# Patient Record
Sex: FEMALE | Race: WHITE | NOT HISPANIC OR LATINO | Employment: FULL TIME | URBAN - METROPOLITAN AREA
[De-identification: names, ages, dates, MRNs, and addresses within clinical notes are randomized per-mention and may not be internally consistent; named-entity substitution may affect disease eponyms.]

---

## 2022-02-21 ENCOUNTER — TELEPHONE (OUTPATIENT)
Dept: SCHEDULING | Facility: IMAGING CENTER | Age: 32
End: 2022-02-21

## 2022-03-11 ENCOUNTER — OFFICE VISIT (OUTPATIENT)
Dept: MEDICAL GROUP | Facility: PHYSICIAN GROUP | Age: 32
End: 2022-03-11
Payer: COMMERCIAL

## 2022-03-11 VITALS
BODY MASS INDEX: 22.89 KG/M2 | TEMPERATURE: 97.5 F | SYSTOLIC BLOOD PRESSURE: 102 MMHG | RESPIRATION RATE: 18 BRPM | WEIGHT: 169 LBS | OXYGEN SATURATION: 98 % | DIASTOLIC BLOOD PRESSURE: 67 MMHG | HEART RATE: 89 BPM | HEIGHT: 72 IN

## 2022-03-11 DIAGNOSIS — M65.4 TENDINITIS, DE QUERVAIN'S: ICD-10-CM

## 2022-03-11 DIAGNOSIS — Z98.890 HISTORY OF REPAIR OF ACL: ICD-10-CM

## 2022-03-11 DIAGNOSIS — F90.9 ATTENTION DEFICIT HYPERACTIVITY DISORDER (ADHD), UNSPECIFIED ADHD TYPE: ICD-10-CM

## 2022-03-11 DIAGNOSIS — Z87.42 HISTORY OF ABNORMAL CERVICAL PAP SMEAR: ICD-10-CM

## 2022-03-11 DIAGNOSIS — Z72.0 VAPES NICOTINE CONTAINING SUBSTANCE: ICD-10-CM

## 2022-03-11 DIAGNOSIS — Z00.00 ENCOUNTER FOR MEDICAL EXAMINATION TO ESTABLISH CARE: ICD-10-CM

## 2022-03-11 DIAGNOSIS — F33.41 RECURRENT MAJOR DEPRESSIVE DISORDER, IN PARTIAL REMISSION (HCC): ICD-10-CM

## 2022-03-11 DIAGNOSIS — F90.0 ADHD (ATTENTION DEFICIT HYPERACTIVITY DISORDER), INATTENTIVE TYPE: ICD-10-CM

## 2022-03-11 PROCEDURE — 99204 OFFICE O/P NEW MOD 45 MIN: CPT

## 2022-03-11 RX ORDER — BUPROPION HYDROCHLORIDE 150 MG/1
150 TABLET, EXTENDED RELEASE ORAL
COMMUNITY
End: 2022-05-09 | Stop reason: SDUPTHER

## 2022-03-11 RX ORDER — DEXTROAMPHETAMINE SACCHARATE, AMPHETAMINE ASPARTATE, DEXTROAMPHETAMINE SULFATE AND AMPHETAMINE SULFATE 5; 5; 5; 5 MG/1; MG/1; MG/1; MG/1
20 TABLET ORAL 4 TIMES DAILY
COMMUNITY
End: 2022-04-05 | Stop reason: SDUPTHER

## 2022-03-11 ASSESSMENT — PATIENT HEALTH QUESTIONNAIRE - PHQ9: CLINICAL INTERPRETATION OF PHQ2 SCORE: 0

## 2022-03-11 NOTE — ASSESSMENT & PLAN NOTE
Patient is currently on Wellbutrin.  She reports good management of her depression with this medication.  Again, she wishes for referral to psychiatry and psychology for management

## 2022-03-11 NOTE — ASSESSMENT & PLAN NOTE
Patient reports a longstanding history of ADHD and is requesting a referral to psychiatry today.  She currently manages this with 10 mg of Adderall 1-2 times per day.  She does not need this refilled today

## 2022-03-11 NOTE — PROGRESS NOTES
CC:    Chief Complaint   Patient presents with   • Establish Care   • Referral Needed        HISTORY OF THE PRESENT ILLNESS: This is a pleasant 31 y.o. female presenting today to [facility], who wishes to discuss the following problems listed below:     ADHD  Patient reports a longstanding history of ADHD and is requesting a referral to psychiatry today.  She currently manages this with 10 mg of Adderall 1-2 times per day.  She does not need this refilled today    Recurrent major depressive disorder, in partial remission (HCC)  Patient is currently on Wellbutrin.  She reports good management of her depression with this medication.  Again, she wishes for referral to psychiatry and psychology for management      No past medical history on file.    Allergies: Patient has no known allergies.    Current Outpatient Medications   Medication Sig Dispense Refill   • amphetamine-dextroamphetamine (ADDERALL) 20 MG Tab Take 20 mg by mouth 4 times a day. Take half a tab     • buPROPion SR (WELLBUTRIN-SR) 150 MG TABLET SR 12 HR sustained-release tablet Take 150 mg by mouth 1 time a day as needed.       No current facility-administered medications for this visit.       ROS:   Constitutional: Patient denies any fever, chills, night sweats, weight loss/gain, fatigue, or malaise.   Eyes: Patient denies any photophobia, eye pain, diplopia, discharge, dryness, excessive tearing, redness, or VA changes.   ENT: Patient denies any runny nose, hoarseness, sore throat, or dysphagia.   Resp: Patient denies cough, wheezing, or shortness of breath.   CV: Patient denies any chest pain, claudication, cyanosis, palpitations, or edema.   GI: Patient denies any constipation, nausea, vomiting, diarrhea, bloating, abdominal pain, or dyspepsia.   MSK: Patient denies any joint pain, cramps, swelling, weakness, stiffness, or tremor  Skin: Patient denies any color changes, skin changes, lesions, ulcerations, wounds, and pruritus, hair or nail  changes.   Neuro: Patient denies any headache, numbness or tingling  Psych: Patient denies any suicidal or homicidal ideation, depression or anxiety.     Exam: /67 (BP Location: Left arm, Patient Position: Sitting, BP Cuff Size: Adult)   Pulse 89   Temp 36.4 °C (97.5 °F) (Temporal)   Resp 18   Ht 1.829 m (6')   Wt 76.7 kg (169 lb)   SpO2 98%  Body mass index is 22.92 kg/m².    Gen: Alert and oriented x4. Well developed, well-nourished female in no apparent distress.  Skin: Warm, dry, good turgor, no rashes in visible areas or lacerations appreciated.   Eye: EOM intact, pupils equal, round and reactive, conjunctiva clear, lids normal.  Neck: Trachea midline, no masses, no thyromegaly  GI:  Soft, non-tender abdomen with no distention.   MSK: Normal gait, moves all extremities.  Neuro: Alert and oriented x 4, non-focal exam with motor and sensory grossly intact.  Ext: No clubbing, cyanosis, edema.  Psych: Normal behavior, affect and mood.      Assessment/Plan:    31 y.o. female with the followin. Encounter for medical examination to establish care  - Comp Metabolic Panel; Future  - CBC WITHOUT DIFFERENTIAL; Future  - TSH WITH REFLEX TO FT4; Future  - VITAMIN D,25 HYDROXY; Future    2. ADHD (attention deficit hyperactivity disorder), inattentive type  Chronic condition.  Managed with Adderall 10 mg 1-2 times per day.  No refill needed today.  PDMP from [place] reviewed, appropriate.  Referrals to psychiatry and psychology placed today.  Advised patient that if she does need a refill of these medications prior to acceptance into psychiatry then she may request a refill from our office  - Referral to Psychiatry  - Referral to Psychology    3. Recurrent major depressive disorder, in partial remission (HCC)  Chronic condition.  Managed with Wellbutrin 150 mg p.o. daily.  She reports that Wellbutrin is working well for her when she remembers to take it.  She does verbalize some communication issues with her  and her partner and would like to establish with psychology for better management of this  - Referral to Psychiatry  - Referral to Psychology    4.  Vapes nicotine-containing substance  Patient does report that she gave up smoking and now uses a low-temperature vapor pen to reduce her risk of popcorn lung.  Approximately 3 minutes spent discussing the risk of smoking, nicotine, and vaping.  Patient verbalizes understanding and is well educated.    Follow-up: Return in about 1 year (around 3/11/2023).    Health Maintenance: Completed.  Patient did have an updated Pap March 2021.  Normal.  Due for updated Pap in March 2024    I have placed the below orders and discussed them with an approved delegating provider.  The MA is performing the below orders under the direction of Brianna Farfan MD.    Please note that this dictation was created using voice recognition software. I have made every reasonable attempt to correct obvious errors, but I expect that there are errors of grammar and possibly content that I did not discover before finalizing the note.    Electronically signed by NAVARRO Clark on March 11, 2022

## 2022-04-27 DIAGNOSIS — F90.0 ADHD (ATTENTION DEFICIT HYPERACTIVITY DISORDER), INATTENTIVE TYPE: ICD-10-CM

## 2022-04-27 RX ORDER — DEXTROAMPHETAMINE SACCHARATE, AMPHETAMINE ASPARTATE, DEXTROAMPHETAMINE SULFATE AND AMPHETAMINE SULFATE 5; 5; 5; 5 MG/1; MG/1; MG/1; MG/1
20 TABLET ORAL 2 TIMES DAILY
Qty: 60 EACH | Refills: 0 | Status: SHIPPED | OUTPATIENT
Start: 2022-04-27 | End: 2022-06-01 | Stop reason: SDUPTHER

## 2022-05-09 RX ORDER — BUPROPION HYDROCHLORIDE 150 MG/1
150 TABLET, EXTENDED RELEASE ORAL DAILY
Qty: 90 TABLET | Refills: 0 | Status: SHIPPED
Start: 2022-05-09 | End: 2022-05-13

## 2022-05-13 DIAGNOSIS — F33.41 RECURRENT MAJOR DEPRESSIVE DISORDER, IN PARTIAL REMISSION (HCC): ICD-10-CM

## 2022-05-13 DIAGNOSIS — F90.0 ADHD (ATTENTION DEFICIT HYPERACTIVITY DISORDER), INATTENTIVE TYPE: ICD-10-CM

## 2022-05-13 RX ORDER — BUPROPION HYDROCHLORIDE 150 MG/1
150 TABLET ORAL EVERY MORNING
Qty: 90 TABLET | Refills: 1 | Status: SHIPPED | OUTPATIENT
Start: 2022-05-13 | End: 2022-07-06 | Stop reason: SDUPTHER

## 2022-06-01 DIAGNOSIS — F90.0 ADHD (ATTENTION DEFICIT HYPERACTIVITY DISORDER), INATTENTIVE TYPE: ICD-10-CM

## 2022-06-02 RX ORDER — DEXTROAMPHETAMINE SACCHARATE, AMPHETAMINE ASPARTATE, DEXTROAMPHETAMINE SULFATE AND AMPHETAMINE SULFATE 5; 5; 5; 5 MG/1; MG/1; MG/1; MG/1
20 TABLET ORAL 2 TIMES DAILY
Qty: 60 EACH | Refills: 0 | Status: SHIPPED | OUTPATIENT
Start: 2022-06-02 | End: 2022-07-02

## 2022-06-06 PROBLEM — Z86.16 HISTORY OF COVID-19: Status: ACTIVE | Noted: 2022-06-06

## 2022-07-06 ENCOUNTER — TELEMEDICINE (OUTPATIENT)
Dept: BEHAVIORAL HEALTH | Facility: CLINIC | Age: 32
End: 2022-07-06
Payer: COMMERCIAL

## 2022-07-06 DIAGNOSIS — F90.2 ATTENTION DEFICIT HYPERACTIVITY DISORDER (ADHD), COMBINED TYPE: ICD-10-CM

## 2022-07-06 DIAGNOSIS — F90.0 ADHD (ATTENTION DEFICIT HYPERACTIVITY DISORDER), INATTENTIVE TYPE: ICD-10-CM

## 2022-07-06 DIAGNOSIS — F90.2 ADHD (ATTENTION DEFICIT HYPERACTIVITY DISORDER), COMBINED TYPE: ICD-10-CM

## 2022-07-06 DIAGNOSIS — F33.41 RECURRENT MAJOR DEPRESSIVE DISORDER, IN PARTIAL REMISSION (HCC): ICD-10-CM

## 2022-07-06 PROCEDURE — 90833 PSYTX W PT W E/M 30 MIN: CPT | Mod: GT | Performed by: PSYCHIATRY & NEUROLOGY

## 2022-07-06 PROCEDURE — 99204 OFFICE O/P NEW MOD 45 MIN: CPT | Mod: GT | Performed by: PSYCHIATRY & NEUROLOGY

## 2022-07-06 RX ORDER — BUPROPION HYDROCHLORIDE 150 MG/1
150 TABLET ORAL EVERY MORNING
Qty: 90 TABLET | Refills: 0 | Status: SHIPPED | OUTPATIENT
Start: 2022-07-06

## 2022-07-06 RX ORDER — DEXTROAMPHETAMINE SACCHARATE, AMPHETAMINE ASPARTATE, DEXTROAMPHETAMINE SULFATE AND AMPHETAMINE SULFATE 5; 5; 5; 5 MG/1; MG/1; MG/1; MG/1
20 TABLET ORAL
Qty: 30 TABLET | Refills: 0 | Status: SHIPPED | OUTPATIENT
Start: 2022-07-06 | End: 2022-08-05

## 2022-07-06 RX ORDER — DEXTROAMPHETAMINE SACCHARATE, AMPHETAMINE ASPARTATE MONOHYDRATE, DEXTROAMPHETAMINE SULFATE AND AMPHETAMINE SULFATE 5; 5; 5; 5 MG/1; MG/1; MG/1; MG/1
20 CAPSULE, EXTENDED RELEASE ORAL EVERY MORNING
Qty: 30 CAPSULE | Refills: 0 | Status: SHIPPED | OUTPATIENT
Start: 2022-07-06 | End: 2022-08-05

## 2022-07-06 ASSESSMENT — PATIENT HEALTH QUESTIONNAIRE - PHQ9
CLINICAL INTERPRETATION OF PHQ2 SCORE: 0
5. POOR APPETITE OR OVEREATING: 3 - NEARLY EVERY DAY
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
## 2022-07-30 ENCOUNTER — HOSPITAL ENCOUNTER (OUTPATIENT)
Dept: RADIOLOGY | Facility: MEDICAL CENTER | Age: 32
End: 2022-07-30
Attending: NURSE PRACTITIONER
Payer: COMMERCIAL

## 2022-07-30 ENCOUNTER — OFFICE VISIT (OUTPATIENT)
Dept: URGENT CARE | Facility: PHYSICIAN GROUP | Age: 32
End: 2022-07-30
Payer: COMMERCIAL

## 2022-07-30 VITALS
SYSTOLIC BLOOD PRESSURE: 90 MMHG | DIASTOLIC BLOOD PRESSURE: 68 MMHG | TEMPERATURE: 98.2 F | OXYGEN SATURATION: 95 % | HEART RATE: 94 BPM

## 2022-07-30 DIAGNOSIS — S86.912A KNEE STRAIN, LEFT, INITIAL ENCOUNTER: ICD-10-CM

## 2022-07-30 DIAGNOSIS — S89.92XA LEFT KNEE INJURY, INITIAL ENCOUNTER: ICD-10-CM

## 2022-07-30 DIAGNOSIS — M25.462 EFFUSION OF LEFT KNEE: ICD-10-CM

## 2022-07-30 PROCEDURE — 73564 X-RAY EXAM KNEE 4 OR MORE: CPT | Mod: LT

## 2022-07-30 PROCEDURE — 99213 OFFICE O/P EST LOW 20 MIN: CPT | Performed by: NURSE PRACTITIONER

## 2022-07-30 ASSESSMENT — ENCOUNTER SYMPTOMS
WEAKNESS: 0
CHILLS: 0
FALLS: 1
BRUISES/BLEEDS EASILY: 0
SENSORY CHANGE: 0
TINGLING: 0
MYALGIAS: 1
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
## 2022-08-23 ENCOUNTER — OFFICE VISIT (OUTPATIENT)
Dept: SPORTS MEDICINE | Facility: CLINIC | Age: 32
End: 2022-08-23
Payer: COMMERCIAL

## 2022-08-23 VITALS
TEMPERATURE: 98.8 F | HEART RATE: 95 BPM | BODY MASS INDEX: 22.89 KG/M2 | RESPIRATION RATE: 16 BRPM | OXYGEN SATURATION: 99 % | HEIGHT: 72 IN | SYSTOLIC BLOOD PRESSURE: 116 MMHG | DIASTOLIC BLOOD PRESSURE: 76 MMHG | WEIGHT: 169 LBS

## 2022-08-23 DIAGNOSIS — R29.898 POOR BODY MECHANICS: ICD-10-CM

## 2022-08-23 DIAGNOSIS — S86.912A KNEE STRAIN, LEFT, INITIAL ENCOUNTER: ICD-10-CM

## 2022-08-23 DIAGNOSIS — Z98.890 S/P LEFT KNEE SURGERY: ICD-10-CM

## 2022-08-23 PROCEDURE — 99213 OFFICE O/P EST LOW 20 MIN: CPT | Performed by: FAMILY MEDICINE
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
## 2022-09-26 ENCOUNTER — HOSPITAL ENCOUNTER (OUTPATIENT)
Dept: RADIOLOGY | Facility: MEDICAL CENTER | Age: 32
End: 2022-09-26
Payer: COMMERCIAL

## 2022-09-26 ENCOUNTER — OFFICE VISIT (OUTPATIENT)
Dept: MEDICAL GROUP | Facility: PHYSICIAN GROUP | Age: 32
End: 2022-09-26
Payer: COMMERCIAL

## 2022-09-26 VITALS
SYSTOLIC BLOOD PRESSURE: 108 MMHG | WEIGHT: 181.4 LBS | RESPIRATION RATE: 16 BRPM | BODY MASS INDEX: 24.57 KG/M2 | HEIGHT: 72 IN | DIASTOLIC BLOOD PRESSURE: 72 MMHG | HEART RATE: 70 BPM | TEMPERATURE: 97.6 F | OXYGEN SATURATION: 97 %

## 2022-09-26 DIAGNOSIS — M20.61 DEFORMITY OF TOE OF RIGHT FOOT: ICD-10-CM

## 2022-09-26 DIAGNOSIS — F33.41 RECURRENT MAJOR DEPRESSIVE DISORDER, IN PARTIAL REMISSION (HCC): ICD-10-CM

## 2022-09-26 DIAGNOSIS — M79.674 PAIN AND SWELLING OF TOE OF RIGHT FOOT: ICD-10-CM

## 2022-09-26 DIAGNOSIS — M79.89 PAIN AND SWELLING OF TOE OF RIGHT FOOT: ICD-10-CM

## 2022-09-26 PROCEDURE — 99214 OFFICE O/P EST MOD 30 MIN: CPT

## 2022-09-26 PROCEDURE — 73660 X-RAY EXAM OF TOE(S): CPT | Mod: RT

## 2022-09-26 RX ORDER — DEXTROAMPHETAMINE SACCHARATE, AMPHETAMINE ASPARTATE, DEXTROAMPHETAMINE SULFATE AND AMPHETAMINE SULFATE 5; 5; 5; 5 MG/1; MG/1; MG/1; MG/1
TABLET ORAL
COMMUNITY
Start: 2022-09-01

## 2022-09-26 RX ORDER — MELOXICAM 15 MG/1
15 TABLET ORAL DAILY
Qty: 30 TABLET | Refills: 1 | Status: SHIPPED | OUTPATIENT
Start: 2022-09-26

## 2022-09-26 RX ORDER — DEXTROAMPHETAMINE SACCHARATE, AMPHETAMINE ASPARTATE MONOHYDRATE, DEXTROAMPHETAMINE SULFATE AND AMPHETAMINE SULFATE 5; 5; 5; 5 MG/1; MG/1; MG/1; MG/1
CAPSULE, EXTENDED RELEASE ORAL
COMMUNITY
Start: 2022-09-01

## 2022-09-26 ASSESSMENT — PATIENT HEALTH QUESTIONNAIRE - PHQ9
9. THOUGHTS THAT YOU WOULD BE BETTER OFF DEAD, OR OF HURTING YOURSELF: SEVERAL DAYS
7. TROUBLE CONCENTRATING ON THINGS, SUCH AS READING THE NEWSPAPER OR WATCHING TELEVISION: MORE THAN HALF THE DAYS
8. MOVING OR SPEAKING SO SLOWLY THAT OTHER PEOPLE COULD HAVE NOTICED. OR THE OPPOSITE, BEING SO FIGETY OR RESTLESS THAT YOU HAVE BEEN MOVING AROUND A LOT MORE THAN USUAL: NOT AT ALL
5. POOR APPETITE OR OVEREATING: NEARLY EVERY DAY
SUM OF ALL RESPONSES TO PHQ QUESTIONS 1-9: 15
6. FEELING BAD ABOUT YOURSELF - OR THAT YOU ARE A FAILURE OR HAVE LET YOURSELF OR YOUR FAMILY DOWN: SEVERAL DAYS
1. LITTLE INTEREST OR PLEASURE IN DOING THINGS: NEARLY EVERY DAY
3. TROUBLE FALLING OR STAYING ASLEEP OR SLEEPING TOO MUCH: NEARLY EVERY DAY
4. FEELING TIRED OR HAVING LITTLE ENERGY: SEVERAL DAYS
SUM OF ALL RESPONSES TO PHQ QUESTIONS 1-9: 19
5. POOR APPETITE OR OVEREATING: 3 - NEARLY EVERY DAY
2. FEELING DOWN, DEPRESSED, IRRITABLE, OR HOPELESS: SEVERAL DAYS
CLINICAL INTERPRETATION OF PHQ2 SCORE: 6
SUM OF ALL RESPONSES TO PHQ9 QUESTIONS 1 AND 2: 4

## 2022-09-26 NOTE — ASSESSMENT & PLAN NOTE
Patient is currently taking bupropion 150 every morning.  She has established with Paramjit at [facility] for her psychiatry management.  She does feel that her ADHD is under control with extended release Adderall, 1 tablet in the morning and 1/2 tablet as needed throughout the day.  However, her depression is not well managed.  She does have an appointment upcoming with Paramjit next week to further discuss

## 2022-09-26 NOTE — ASSESSMENT & PLAN NOTE
Patient reports that she grew frustrated with her screen door this morning and kicked the screen in order to get it open.  She immediately felt pain and swelling in the right great toe.  She presents for further evaluation.  She endorses limited range of motion in the toe and pain with walking.  Fortunately she does have crutches from her previous knee injury.

## 2022-09-26 NOTE — PROGRESS NOTES
CC:   Chief Complaint   Patient presents with    Toe Pain     rt        HISTORY OF PRESENT ILLNESS: Patient is a 32 y.o. female established patient who presents today to discuss the following problems below:     Pain and swelling of toe of right foot  Patient reports that she grew frustrated with her screen door this morning and kicked the screen in order to get it open.  She immediately felt pain and swelling in the right great toe.  She presents for further evaluation.  She endorses limited range of motion in the toe and pain with walking.  Fortunately she does have crutches from her previous knee injury.    Recurrent major depressive disorder, in partial remission (HCC)  Patient is currently taking bupropion 150 every morning.  She has established with Paramjit at [facility] for her psychiatry management.  She does feel that her ADHD is under control with extended release Adderall, 1 tablet in the morning and 1/2 tablet as needed throughout the day.  However, her depression is not well managed.  She does have an appointment upcoming with Paramjit next week to further discuss    No past medical history on file.    Allergies:Patient has no known allergies.    Review of Systems: Otherwise negative except for as stated above.      Exam: /72 (BP Location: Left arm, Patient Position: Sitting, BP Cuff Size: Adult)   Pulse 70   Temp 36.4 °C (97.6 °F) (Temporal)   Resp 16   Ht 1.829 m (6')   Wt 82.3 kg (181 lb 6.4 oz)   SpO2 97%  Body mass index is 24.6 kg/m².    Gen: Alert and oriented x4. Well developed, well-nourished female in no apparent distress.  Skin: Warm, dry, good turgor, no rashes in visible areas or lacerations appreciated.   Eye: EOM intact, pupils equal, round and reactive, conjunctiva clear, lids normal.  Neck: Trachea midline, no masses, no thyromegaly  Lungs: Normal effort, CTA bilaterally, no wheezes, rhonchi, or rales. No stridor or audible wheezing. Equal chest expansion.   CV: Regular rate  and rhythm. No murmurs, rubs, or gallops.  GI:  Soft, non-tender abdomen with no distention.   MSK: + right toe swelling with limited ROM, tenderness to palpation   Neuro: Alert and oriented x 4, non-focal exam with motor and sensory grossly intact.  Ext: No clubbing, cyanosis, edema.  Psych: Normal behavior, affect and mood.    Depression Screening    Little interest or pleasure in doing things?  3 - nearly every dayNearly every day  Feeling down, depressed , or hopeless? 3 - nearly every daySeveral days  Trouble falling or staying asleep, or sleeping too much?  3 - nearly every dayNearly every day  Feeling tired or having little energy?  3 - nearly every daySeveral days  Poor appetite or overeating?  3 - nearly every dayNearly every day  Feeling bad about yourself - or that you are a failure or have let yourself or your family down? 2 - more than half the daysSeveral days  Trouble concentrating on things, such as reading the newspaper or watching television? 1 - several daysMore than half the days  Moving or speaking so slowly that other people could have noticed.  Or the opposite - being so fidgety or restless that you have been moving around a lot more than usual?  0 - not at allNot at all  Thoughts that you would be better off dead, or of hurting yourself?  1 - several daysSeveral days  Patient Health Questionnaire Score: 19(!) 15      If depressive symptoms identified deferred to follow up visit unless specifically addressed in assesment and plan.    Interpretation of PHQ-9 Total Score   Score Severity   1-4 No Depression   5-9 Mild Depression   10-14 Moderate Depression   15-19 Moderately Severe Depression   20-27 Severe Depression        Assessment/Plan:  32 y.o. female with the following -    1. Pain and swelling of toe of right foot  Acute condition.  Discussed with patient that I would like her to remain nonweightbearing on the right foot pending x-ray of the toe.  If broken, can refer to Ortho.   Discussed light weightbearing, anti-inflammatories, ice, and elevation.  - DX-TOE(S) 2+ RIGHT; Future  - meloxicam (MOBIC) 15 MG tablet; Take 1 Tablet by mouth every day.  Dispense: 30 Tablet; Refill: 1  - Walking Boot    2. Deformity of toe of right foot  Patient has a callus on the right lateral aspect of her fifth toe that is uncomfortable in shoes.  Somewhat of a bony prominence noted.  She would like referral to podiatry  - Referral to Podiatry    3. Recurrent major depressive disorder, in partial remission (HCC)  Chronic condition, not at goal.  Continue management with Paramjit [facility] per psychiatry.  Patient has no active plans for self-harm or suicide.  She is planning on discussing her depression with him at this follow-up appointment      Follow-up: Return if symptoms worsen or fail to improve.    Health Maintenance: Completed      Please note that this dictation was created using voice recognition software. I have made every reasonable attempt to correct obvious errors, but I expect that there are errors of grammar and possibly content that I did not discover before finalizing the note.    Electronically signed by NAVARRO Clark on September 26, 2022

## 2023-01-14 ENCOUNTER — OFFICE VISIT (OUTPATIENT)
Dept: URGENT CARE | Facility: PHYSICIAN GROUP | Age: 33
End: 2023-01-14
Payer: COMMERCIAL

## 2023-01-14 VITALS
BODY MASS INDEX: 23.84 KG/M2 | SYSTOLIC BLOOD PRESSURE: 114 MMHG | WEIGHT: 176 LBS | OXYGEN SATURATION: 97 % | HEIGHT: 72 IN | RESPIRATION RATE: 18 BRPM | HEART RATE: 97 BPM | DIASTOLIC BLOOD PRESSURE: 74 MMHG | TEMPERATURE: 97.3 F

## 2023-01-14 DIAGNOSIS — R09.81 NASAL CONGESTION WITH RHINORRHEA: ICD-10-CM

## 2023-01-14 DIAGNOSIS — J02.9 SORE THROAT: ICD-10-CM

## 2023-01-14 DIAGNOSIS — J06.9 VIRAL URI WITH COUGH: ICD-10-CM

## 2023-01-14 DIAGNOSIS — J34.89 NASAL CONGESTION WITH RHINORRHEA: ICD-10-CM

## 2023-01-14 DIAGNOSIS — H92.01 RIGHT EAR PAIN: ICD-10-CM

## 2023-01-14 DIAGNOSIS — H10.31 ACUTE BACTERIAL CONJUNCTIVITIS OF RIGHT EYE: ICD-10-CM

## 2023-01-14 DIAGNOSIS — R05.1 ACUTE COUGH: ICD-10-CM

## 2023-01-14 LAB
INT CON NEG: NEGATIVE
INT CON POS: POSITIVE
S PYO AG THROAT QL: NEGATIVE

## 2023-01-14 PROCEDURE — 99213 OFFICE O/P EST LOW 20 MIN: CPT | Performed by: NURSE PRACTITIONER

## 2023-01-14 PROCEDURE — 87880 STREP A ASSAY W/OPTIC: CPT | Performed by: NURSE PRACTITIONER

## 2023-01-14 RX ORDER — POLYMYXIN B SULFATE AND TRIMETHOPRIM 1; 10000 MG/ML; [USP'U]/ML
1 SOLUTION OPHTHALMIC EVERY 4 HOURS
Qty: 10 ML | Refills: 0 | Status: SHIPPED | OUTPATIENT
Start: 2023-01-14

## 2023-01-14 ASSESSMENT — ENCOUNTER SYMPTOMS
FEVER: 1
DIZZINESS: 0
VOMITING: 0
WHEEZING: 0
SINUS PAIN: 0
SORE THROAT: 1
CHILLS: 1
EYE REDNESS: 1
COUGH: 1
HEADACHES: 1
ABDOMINAL PAIN: 0
NAUSEA: 0
MYALGIAS: 0
CONSTIPATION: 0
WEAKNESS: 0
NECK PAIN: 0
SHORTNESS OF BREATH: 1
DIARRHEA: 0
EYE DISCHARGE: 1

## 2023-01-14 NOTE — PROGRESS NOTES
Subjective     Isadora Emmanuel is a 32 y.o. female who presents with Cough (X 1 week, headache, otalgia, eye irritation, nasal congestion)            Cough  Associated symptoms include chills, ear pain, eye redness, a fever, headaches, a sore throat and shortness of breath. Pertinent negatives include no myalgias, rash or wheezing. There is no history of environmental allergies. States has been experiencing nasal congestion, runny nose, postnasal drainage, sore throat, cough x1 week.  Experiencing right ear pain as well as right eye redness and discharge.  Sinus headache. Denies fever with symptoms.  No salt water gargle, Flonase or saline rinsing.  States mild shortness of breath with deep breathing and cough without wheeze or history of asthma.    PMH:  has no past medical history on file.  MEDS:   Current Outpatient Medications:     polymixin-trimethoprim (POLYTRIM) 28741-0.1 UNIT/ML-% Solution, Administer 1 Drop into both eyes every 4 hours., Disp: 10 mL, Rfl: 0    amphetamine-dextroamphetamine (ADDERALL XR) 20 MG per XR capsule, , Disp: , Rfl:     amphetamine-dextroamphetamine (ADDERALL) 20 MG Tab, , Disp: , Rfl:     buPROPion (WELLBUTRIN XL) 150 MG XL tablet, Take 1 Tablet by mouth every morning., Disp: 90 Tablet, Rfl: 0    meloxicam (MOBIC) 15 MG tablet, Take 1 Tablet by mouth every day., Disp: 30 Tablet, Rfl: 1  ALLERGIES: No Known Allergies  SURGHX:   Past Surgical History:   Procedure Laterality Date    REPAIR, KNEE, ACL Left     back in HS     SOCHX:  reports that she has quit smoking. She has never used smokeless tobacco. She reports current alcohol use of about 3.6 oz per week. She reports that she does not use drugs.  FH: Family history was reviewed, no pertinent findings to report      Review of Systems   Constitutional:  Positive for chills, fever and malaise/fatigue.   HENT:  Positive for congestion, ear pain and sore throat. Negative for ear discharge, hearing loss, sinus pain and tinnitus.     Eyes:  Positive for discharge and redness.   Respiratory:  Positive for cough and shortness of breath. Negative for wheezing.    Gastrointestinal:  Negative for abdominal pain, constipation, diarrhea, nausea and vomiting.   Musculoskeletal:  Negative for myalgias and neck pain.   Skin:  Negative for itching and rash.   Neurological:  Positive for headaches. Negative for dizziness and weakness.   Endo/Heme/Allergies:  Negative for environmental allergies.   All other systems reviewed and are negative.           Objective     /74   Pulse 97   Temp 36.3 °C (97.3 °F)   Resp 18   Ht 1.829 m (6')   Wt 79.8 kg (176 lb)   SpO2 97%   BMI 23.87 kg/m²      Physical Exam  Vitals reviewed.   Constitutional:       General: She is awake. She is not in acute distress.     Appearance: Normal appearance. She is well-developed. She is not ill-appearing, toxic-appearing or diaphoretic.   HENT:      Head: Normocephalic.      Right Ear: Ear canal and external ear normal. There is impacted cerumen.      Left Ear: Ear canal and external ear normal. A middle ear effusion is present.      Nose: Mucosal edema, congestion and rhinorrhea present.      Right Turbinates: Swollen.      Mouth/Throat:      Lips: Pink.      Mouth: Mucous membranes are dry.      Pharynx: Posterior oropharyngeal erythema present.   Eyes:      Conjunctiva/sclera: Conjunctivae normal.      Pupils: Pupils are equal, round, and reactive to light.   Cardiovascular:      Rate and Rhythm: Normal rate.   Pulmonary:      Effort: Pulmonary effort is normal.      Breath sounds: Normal breath sounds and air entry.   Musculoskeletal:         General: Normal range of motion.      Cervical back: Normal range of motion and neck supple.   Skin:     General: Skin is warm and dry.   Neurological:      Mental Status: She is alert and oriented to person, place, and time.   Psychiatric:         Attention and Perception: Attention normal.         Mood and Affect: Mood normal.          Speech: Speech normal.         Behavior: Behavior normal. Behavior is cooperative.                           Assessment & Plan        1. Sore throat    - POCT Rapid Strep A    2. Acute bacterial conjunctivitis of right eye    - polymixin-trimethoprim (POLYTRIM) 41644-5.1 UNIT/ML-% Solution; Administer 1 Drop into both eyes every 4 hours.  Dispense: 10 mL; Refill: 0    3. Right ear pain    - Ear Cerumen Removal    4. Nasal congestion with rhinorrhea      5. Acute cough      6. Viral URI with cough    -Maintain hydration/water intake  -May use over the counter Ibuprofen/Tylenol as needed for fever  -May use humidifier/vaporizer at home as needed for dry cough/nasal passages  -Gargle salt water or throat lozenges as needed for throat irritation/dry cough  -Get rest  -May use daily longer acting antihistamine as needed (no decongestant) for any post nasal drainage   -May use saline nasal spray/Flonase as needed to flush any nasal congestion/post nasal drainage   -Monitor for fevers, worse cough, phlegm, shortness of breath, wheeze, chest tightness- need re-evaluation

## 2023-01-19 ENCOUNTER — OFFICE VISIT (OUTPATIENT)
Dept: URGENT CARE | Facility: PHYSICIAN GROUP | Age: 33
End: 2023-01-19

## 2023-01-19 VITALS
SYSTOLIC BLOOD PRESSURE: 122 MMHG | HEIGHT: 72 IN | WEIGHT: 178 LBS | OXYGEN SATURATION: 96 % | TEMPERATURE: 98.2 F | HEART RATE: 89 BPM | RESPIRATION RATE: 18 BRPM | BODY MASS INDEX: 24.11 KG/M2 | DIASTOLIC BLOOD PRESSURE: 80 MMHG

## 2023-01-19 DIAGNOSIS — H66.90 ACUTE OTITIS MEDIA, UNSPECIFIED OTITIS MEDIA TYPE: ICD-10-CM

## 2023-01-19 DIAGNOSIS — T36.95XA ANTIBIOTIC-INDUCED YEAST INFECTION: ICD-10-CM

## 2023-01-19 DIAGNOSIS — B37.9 ANTIBIOTIC-INDUCED YEAST INFECTION: ICD-10-CM

## 2023-01-19 PROCEDURE — 99213 OFFICE O/P EST LOW 20 MIN: CPT | Performed by: NURSE PRACTITIONER

## 2023-01-19 RX ORDER — AMOXICILLIN AND CLAVULANATE POTASSIUM 875; 125 MG/1; MG/1
1 TABLET, FILM COATED ORAL 2 TIMES DAILY
Qty: 20 TABLET | Refills: 0 | Status: SHIPPED | OUTPATIENT
Start: 2023-01-19 | End: 2023-01-29

## 2023-01-19 RX ORDER — FLUCONAZOLE 150 MG/1
TABLET ORAL
Qty: 2 TABLET | Refills: 0 | Status: SHIPPED | OUTPATIENT
Start: 2023-01-19

## 2023-01-19 NOTE — PROGRESS NOTES
Patient has consented to treatment and for use of patient information for treatment and billing purposes.    Date: 01/19/23     Arrival Mode: Private Vehicle / Ambulatory    Chief Complaint:    Chief Complaint   Patient presents with    Sore Throat     Hurt to swallow and touch, was on the right but now on the left side. Was here at  on 1/14/23. X 1 1/2 week    Ear Pain     L ear pain. Pt feel fluid in ear, on/off 1 1/2 week         History of Present Illness: 32 y.o. female to clinic with 12 days of upper respiratory congestion, cough and worsening ear pain.  Patient presents to clinic today due to sore throat on the left side and pain on the left side of her neck.  She is also having left ear pain and states she feels there is fluid on her ear.  Patient states she was recently seen here approximately 5 days ago and treated for bacterial conjunctivitis.  States that the conjunctivitis and cough have mostly subsided.  Denies any shortness of breath chest pain or leg swelling.  No recent fevers.  She has been using Flonase for symptoms and states that it is significantly helpful.    ROS:  As stated in HPI     Pertinent Medical History:    History reviewed. No pertinent past medical history.     Pertinent Surgical History:    Past Surgical History:   Procedure Laterality Date    REPAIR, KNEE, ACL Left     back in HS        Current  Medications:  Current Outpatient Medications on File Prior to Visit   Medication Sig Dispense Refill    amphetamine-dextroamphetamine (ADDERALL XR) 20 MG per XR capsule       amphetamine-dextroamphetamine (ADDERALL) 20 MG Tab       buPROPion (WELLBUTRIN XL) 150 MG XL tablet Take 1 Tablet by mouth every morning. 90 Tablet 0    polymixin-trimethoprim (POLYTRIM) 32690-9.1 UNIT/ML-% Solution Administer 1 Drop into both eyes every 4 hours. (Patient not taking: Reported on 1/19/2023) 10 mL 0    meloxicam (MOBIC) 15 MG tablet Take 1 Tablet by mouth every day. 30 Tablet 1     No current  facility-administered medications on file prior to visit.        Allergies:     Patient has no known allergies.     Social History:   Social History     Socioeconomic History    Marital status: Single     Spouse name: Not on file    Number of children: Not on file    Years of education: Not on file    Highest education level: Not on file   Occupational History    Not on file   Tobacco Use    Smoking status: Former    Smokeless tobacco: Never   Vaping Use    Vaping Use: Every day    Substances: Nicotine, Flavoring    Devices: Refillable tank   Substance and Sexual Activity    Alcohol use: Yes     Alcohol/week: 3.6 oz     Types: 6 Standard drinks or equivalent per week    Drug use: Never    Sexual activity: Yes     Partners: Female   Other Topics Concern    Not on file   Social History Narrative    Not on file     Social Determinants of Health     Financial Resource Strain: Not on file   Food Insecurity: Not on file   Transportation Needs: Not on file   Physical Activity: Not on file   Stress: Not on file   Social Connections: Not on file   Intimate Partner Violence: Not on file   Housing Stability: Not on file        No LMP recorded.       Physical Exam:  Vitals:    01/19/23 0911   BP: 122/80   Pulse: 89   Resp: 18   Temp: 36.8 °C (98.2 °F)   SpO2: 96%        Physical Exam  Vitals reviewed.   Constitutional:       General: She is not in acute distress.     Appearance: Normal appearance. She is well-developed. She is not toxic-appearing.   HENT:      Head: Normocephalic and atraumatic.      Right Ear: Ear canal and external ear normal. Tympanic membrane is erythematous and bulging.      Left Ear: Ear canal and external ear normal. Tympanic membrane is bulging. Tympanic membrane is not erythematous.      Ears:      Comments: Bilateral tympanic membranes with opaque, mucoid effusion bulging and dull.  Right tympanic membrane is erythematous.     Nose: Congestion and rhinorrhea present.      Mouth/Throat:      Lips:  Pink.      Mouth: Mucous membranes are moist.      Pharynx: Posterior oropharyngeal erythema present.   Eyes:      General: Lids are normal. Gaze aligned appropriately. No allergic shiner or scleral icterus.     Extraocular Movements: Extraocular movements intact.      Conjunctiva/sclera: Conjunctivae normal.      Pupils: Pupils are equal, round, and reactive to light.   Cardiovascular:      Rate and Rhythm: Normal rate and regular rhythm.      Pulses:           Radial pulses are 2+ on the right side and 2+ on the left side.      Heart sounds: Normal heart sounds.   Pulmonary:      Effort: Pulmonary effort is normal.      Breath sounds: Normal breath sounds. No wheezing.   Musculoskeletal:      Right lower leg: No edema.      Left lower leg: No edema.   Lymphadenopathy:      Cervical: Cervical adenopathy present.      Left cervical: Superficial cervical adenopathy (Small tender movable) present.   Skin:     General: Skin is warm.      Capillary Refill: Capillary refill takes less than 2 seconds.      Coloration: Skin is not cyanotic or pale.      Findings: No rash.   Neurological:      Mental Status: She is alert.      Gait: Gait is intact.   Psychiatric:         Behavior: Behavior normal. Behavior is cooperative.        Diagnostics:    None     Medical Decision Making:  I personally reviewed prior external notes and test results pertinent to today's visit.   Shared decision-making was utilized with patient did develop treatment plan and clinic course. Carissa, 32 y.o. female with 12 days of upper respiratory congestion with worsening ear pain.  Patient was seen in this clinic and treated for bacterial conjunctivitis fortunately her eye symptoms have subsided.  Her and her cough is improved.  Since to clinic for concern of ear infection.  Did discuss that she does have bilateral otitis media.  Will treat with Augmentin 10-day course.  We will send for Diflucan for antibiotic induced vaginal yeast infection.   Commend patient continue using Flonase.    Did advise patient on conservative measures for management of symptoms.  Patient is agreeable to pursue adequate rest, adequate hydration, saltwater gargle and Neti pot for any symptoms of upper respiratory congestion.  Over-the-counter analgesia and antipyretics on a p.r.n. basis as needed for pain and fever.  Did discuss over-the-counter cough medications.      Patient will monitor symptoms closely for worsening and is advised to seek further evaluation the emergency room if alarm signs or symptoms arise.  Patient states understanding and verbalizes agreement with this plan of care.    Plan:    1. Acute otitis media, unspecified otitis media type    - amoxicillin-clavulanate (AUGMENTIN) 875-125 MG Tab; Take 1 Tablet by mouth 2 times a day for 10 days.  Dispense: 20 Tablet; Refill: 0    2. Antibiotic-induced yeast infection    - fluconazole (DIFLUCAN) 150 MG tablet; Take one tablet orally for yeast infection, if symptoms persist, may repeat treatment after 72 hours.  Dispense: 2 Tablet; Refill: 0       Disposition:  Patient was discharged in stable condition.    Voice Recognition Disclaimer: Portions of this document were created using voice recognition software. The software does have a chance of producing errors of grammar and possibly content. I have made every reasonable attempt to correct obvious errors, but there may be errors of grammar and possibly content that I did not discover before finalizing the documentation.    Dorinda Stearns, A.P.R.N.

## 2024-06-22 ENCOUNTER — HOSPITAL ENCOUNTER (OUTPATIENT)
Dept: RADIOLOGY | Facility: MEDICAL CENTER | Age: 34
End: 2024-06-22
Payer: COMMERCIAL

## 2024-06-22 ENCOUNTER — OFFICE VISIT (OUTPATIENT)
Dept: URGENT CARE | Facility: PHYSICIAN GROUP | Age: 34
End: 2024-06-22
Payer: COMMERCIAL

## 2024-06-22 VITALS
RESPIRATION RATE: 18 BRPM | TEMPERATURE: 97.6 F | WEIGHT: 193.6 LBS | BODY MASS INDEX: 26.22 KG/M2 | HEIGHT: 72 IN | OXYGEN SATURATION: 97 % | HEART RATE: 76 BPM

## 2024-06-22 DIAGNOSIS — S69.92XA INJURY OF FINGER OF LEFT HAND, INITIAL ENCOUNTER: ICD-10-CM

## 2024-06-22 DIAGNOSIS — S99.912A INJURY OF LEFT ANKLE, INITIAL ENCOUNTER: ICD-10-CM

## 2024-06-22 DIAGNOSIS — S99.911A INJURY OF RIGHT ANKLE, INITIAL ENCOUNTER: ICD-10-CM

## 2024-06-22 DIAGNOSIS — S62.621A CLOSED DISPLACED FRACTURE OF MIDDLE PHALANX OF LEFT INDEX FINGER, INITIAL ENCOUNTER: ICD-10-CM

## 2024-06-22 DIAGNOSIS — S62.611A CLOSED DISPLACED FRACTURE OF PROXIMAL PHALANX OF LEFT INDEX FINGER, INITIAL ENCOUNTER: ICD-10-CM

## 2024-06-22 PROCEDURE — 73610 X-RAY EXAM OF ANKLE: CPT | Mod: RT

## 2024-06-22 PROCEDURE — 73140 X-RAY EXAM OF FINGER(S): CPT | Mod: LT

## 2024-06-22 PROCEDURE — 73610 X-RAY EXAM OF ANKLE: CPT | Mod: LT

## 2024-06-22 PROCEDURE — 99214 OFFICE O/P EST MOD 30 MIN: CPT

## 2024-06-22 ASSESSMENT — ENCOUNTER SYMPTOMS
STRIDOR: 0
FEVER: 0
NAUSEA: 0
SHORTNESS OF BREATH: 0
VOMITING: 0
WEAKNESS: 0
BACK PAIN: 0
NECK PAIN: 0
DIZZINESS: 0

## 2024-06-22 ASSESSMENT — VISUAL ACUITY: OU: 1

## 2024-06-22 NOTE — PROGRESS NOTES
Subjective     Isadora Emmanuel is a 34 y.o. female who presents with left index finger injury, and bilateral ankle injury.     HPI:   Isadora is a 33yo female presenting for left index finger injury and bilateral ankle injury. Reports injury to finger 1 day ago while playing football. Reports left index finger swelling and ecchymosis. Finger flexion is slightly limited due to pain. Reports bilateral ankle pain x1 month after tripping and twisting both of her ankles. Denies numbness/tingling or sensation loss. No fever or shortness of breath. She is able to ambulate. Reports mild swelling to ankles bilaterally. R ankle pain is overlying the lateral malleolus. Right ankle pain is located on the dorsomedial aspect of the foot.        Review of Systems   Constitutional:  Negative for fever.   Respiratory:  Negative for shortness of breath and stridor.    Gastrointestinal:  Negative for nausea and vomiting.   Musculoskeletal:  Positive for joint pain. Negative for back pain and neck pain.   Neurological:  Negative for dizziness and weakness.      History reviewed. No pertinent past medical history.     Past Surgical History:   Procedure Laterality Date    REPAIR, KNEE, ACL Left     back in HS      Patient has no known allergies.     Current Outpatient Medications:     amphetamine-dextroamphetamine (ADDERALL XR) 20 MG per XR capsule, , Disp: , Rfl:     amphetamine-dextroamphetamine (ADDERALL) 20 MG Tab, , Disp: , Rfl:     buPROPion (WELLBUTRIN XL) 150 MG XL tablet, Take 1 Tablet by mouth every morning., Disp: 90 Tablet, Rfl: 0    Social History     Tobacco Use    Smoking status: Former    Smokeless tobacco: Never   Vaping Use    Vaping status: Every Day    Substances: Nicotine, Flavoring    Devices: Ref[employer]rightEdgeble tank   Substance Use Topics    Alcohol use: Yes     Alcohol/week: 3.6 oz     Types: 6 Standard drinks or equivalent per week    Drug use: Never      History reviewed. No pertinent family history.      Medications, Allergies, and current problem list reviewed today in Epic.      Objective     Pulse 76   Temp 36.4 °C (97.6 °F) (Temporal)   Resp 18   Ht 1.829 m (6')   Wt 87.8 kg (193 lb 9.6 oz)   SpO2 97%   BMI 26.26 kg/m²      Physical Exam  Vitals reviewed.   Constitutional:       General: She is not in acute distress.  HENT:      Nose: Nose normal.   Eyes:      General: Vision grossly intact. Gaze aligned appropriately.      Extraocular Movements: Extraocular movements intact.      Conjunctiva/sclera: Conjunctivae normal.      Pupils: Pupils are equal, round, and reactive to light.   Cardiovascular:      Rate and Rhythm: Normal rate and regular rhythm.      Pulses: Normal pulses.           Radial pulses are 2+ on the right side and 2+ on the left side.        Popliteal pulses are 2+ on the right side and 2+ on the left side.        Dorsalis pedis pulses are 2+ on the right side and 2+ on the left side.        Posterior tibial pulses are 2+ on the right side and 2+ on the left side.      Heart sounds: Normal heart sounds.   Pulmonary:      Effort: Pulmonary effort is normal. No respiratory distress.      Breath sounds: Normal breath sounds.   Musculoskeletal:      Left wrist: No swelling, deformity, effusion, tenderness, bony tenderness, snuff box tenderness or crepitus. Normal range of motion. Normal pulse.      Right hand: Normal.      Left hand: Swelling, tenderness and bony tenderness present. No deformity. Decreased range of motion. Decreased strength. Normal sensation. There is no disruption of two-point discrimination. Normal capillary refill. Normal pulse.      Cervical back: Full passive range of motion without pain, normal range of motion and neck supple.      Right lower leg: No swelling, deformity, tenderness or bony tenderness. No edema.      Left lower leg: No swelling, deformity, tenderness or bony tenderness. No edema.      Right ankle: Swelling present. No deformity. No tenderness.  Normal range of motion. Anterior drawer test negative. Normal pulse.      Right Achilles Tendon: Normal. No tenderness or defects. Montoya's test negative.      Left ankle: Swelling present. No deformity. No tenderness. Normal range of motion. Anterior drawer test negative. Normal pulse.      Left Achilles Tendon: Normal. No tenderness or defects. Montoya's test negative.      Right foot: Normal range of motion and normal capillary refill. Swelling and tenderness present. No deformity, foot drop, bony tenderness or crepitus. Normal pulse.      Left foot: Normal range of motion and normal capillary refill. Swelling and tenderness present. No deformity, foot drop, bony tenderness or crepitus. Normal pulse.      Comments: Swelling and ecchymosis to second digit. Tenderness to palpation of proximal and middle phalanx. Full ROM of right wrist and hand. Limited flexion of second digit due to swelling. No subungual hematoma. Motor, sensory, 2 point discrimination intact distal to injury. No signs of compartment syndrome or abnormality in blood flow to digit.    Skin:     General: Skin is warm and dry.      Capillary Refill: Capillary refill takes less than 2 seconds.   Neurological:      Mental Status: She is alert. Mental status is at baseline.   Psychiatric:         Mood and Affect: Mood normal.         Behavior: Behavior normal.         Thought Content: Thought content normal.       DX-ANKLE 3+ VIEWS LEFT    Result Date: 6/22/2024 6/22/2024 1:19 PM HISTORY/REASON FOR EXAM:  Left ankle pain after left ankle injury ground-level fall TECHNIQUE/EXAM DESCRIPTION AND NUMBER OF VIEWS:  3 views of the LEFT ankle. COMPARISON: None FINDINGS: Bone mineralization is normal.  There is no evidence of fracture or dislocation.  There is no soft tissue swelling seen. The ankle mortise is well-maintained.     No evidence of fracture or dislocation.    DX-ANKLE 3+ VIEWS RIGHT    Result Date: 6/22/2024 6/22/2024 1:19 PM HISTORY/REASON  FOR EXAM:  Right ankle pain after right ankle injury ground-level fall TECHNIQUE/EXAM DESCRIPTION AND NUMBER OF VIEWS:  3 views of the RIGHT ankle. COMPARISON: None FINDINGS: Bone mineralization is normal.  There is no evidence of fracture or dislocation.  There is no soft tissue swelling seen. The ankle mortise is well-maintained.     No evidence of fracture or dislocation.    DX-FINGER(S) 2+ LEFT    Result Date: 6/22/2024 6/22/2024 1:18 PM HISTORY/REASON FOR EXAM:  Second digit pain after second digit injury. TECHNIQUE/EXAM DESCRIPTION AND NUMBER OF VIEWS:   3 views of the LEFT fingers. COMPARISON: None FINDINGS: Bone mineralization is normal.  Acute fracture: Proximal edge of the middle phalanx second digit is identified with minimal displacement. Fracture line does extend to the bulbar edge of the proximal articular surface of this middle phalanx.  There is no evidence of arthropathy.  Soft tissue swelling is noted in the second digit.     1.  Subtle minimally displaced fracture proximal and middle phalanx second digit is identified.       Assessment & Plan     1. Injury of finger of left hand, initial encounter   - DX-FINGER(S) 2+ LEFT; Future    2. Injury of left ankle, initial encounter   - DX-ANKLE 3+ VIEWS LEFT; Future    3. Injury of right ankle, initial encounter   - DX-ANKLE 3+ VIEWS RIGHT; Future    4. Closed displaced fracture of proximal phalanx of left index finger, initial encounter   - Referral to Orthopedics    5. Closed displaced fracture of middle phalanx of left index finger, initial encounter   - Referral to Orthopedics       MDM/Comments:   Imaging of finger reveals subtle minimally displaced fracture of proximal and middle phalanx second digit. Motor, sensory, and neurovascular status without abnormality. Split applied and stat referral to Orthopedics placed.    Ankle imaging without evidence of fracture or dislocation.      Recommended supportive treatment at home:      Tylenol/ibuprofen  for discomfort   Cold compress to finger      Differential diagnosis, natural history, supportive care, and indications for immediate follow-up discussed.       Follow-up as needed if symptoms worsen or fail to improve to PCP, Urgent care or Emergency Room.      Illness progression and alarm symptoms discussed with patient, emphasizing low threshold for returning to clinic/emergency department for worsening symptoms. Patient is agreeable to the plan and verbalizes understanding, and will follow up if warranted.        Patient educated on compartment syndrome as well as poor circulation signs and symptoms. If patient exhibits these signs or symptoms they are to go to the the ER or call emergency services. Patient verbalizes understanding.                         Electronically signed by EDWARD Garcia

## 2024-06-22 NOTE — LETTER
[facility] [address] [address]     June 22, 2024    Patient: Isadora Emmanuel   YOB: 1990   Date of Visit: 6/22/2024       To Whom It May Concern:    Isadora Emmanuel was seen and treated in our department on 6/22/2024.     Please excuse Isadora from work 6/22/24.         Sincerely,     ENZO Peñaloza.

## 2024-08-08 ENCOUNTER — OFFICE VISIT (OUTPATIENT)
Dept: MEDICAL GROUP | Facility: PHYSICIAN GROUP | Age: 34
End: 2024-08-08
Payer: COMMERCIAL

## 2024-08-08 VITALS
OXYGEN SATURATION: 100 % | SYSTOLIC BLOOD PRESSURE: 108 MMHG | BODY MASS INDEX: 25.68 KG/M2 | HEART RATE: 83 BPM | HEIGHT: 72 IN | WEIGHT: 189.6 LBS | RESPIRATION RATE: 14 BRPM | TEMPERATURE: 97.2 F | DIASTOLIC BLOOD PRESSURE: 72 MMHG

## 2024-08-08 DIAGNOSIS — G89.29 CHRONIC PAIN OF RIGHT ANKLE: ICD-10-CM

## 2024-08-08 DIAGNOSIS — Z13.21 SCREENING FOR ENDOCRINE, NUTRITIONAL, METABOLIC AND IMMUNITY DISORDER: ICD-10-CM

## 2024-08-08 DIAGNOSIS — Z13.0 SCREENING FOR ENDOCRINE, NUTRITIONAL, METABOLIC AND IMMUNITY DISORDER: ICD-10-CM

## 2024-08-08 DIAGNOSIS — F33.41 RECURRENT MAJOR DEPRESSIVE DISORDER, IN PARTIAL REMISSION (HCC): ICD-10-CM

## 2024-08-08 DIAGNOSIS — Z13.29 SCREENING FOR ENDOCRINE, NUTRITIONAL, METABOLIC AND IMMUNITY DISORDER: ICD-10-CM

## 2024-08-08 DIAGNOSIS — Z11.3 SCREEN FOR SEXUALLY TRANSMITTED DISEASES: ICD-10-CM

## 2024-08-08 DIAGNOSIS — Z11.59 NEED FOR HEPATITIS C SCREENING TEST: ICD-10-CM

## 2024-08-08 DIAGNOSIS — M25.571 CHRONIC PAIN OF RIGHT ANKLE: ICD-10-CM

## 2024-08-08 DIAGNOSIS — Z13.228 SCREENING FOR ENDOCRINE, NUTRITIONAL, METABOLIC AND IMMUNITY DISORDER: ICD-10-CM

## 2024-08-08 DIAGNOSIS — Z11.4 SCREENING FOR HIV (HUMAN IMMUNODEFICIENCY VIRUS): ICD-10-CM

## 2024-08-08 DIAGNOSIS — Z13.79 GENETIC SCREENING: ICD-10-CM

## 2024-08-08 PROCEDURE — 3074F SYST BP LT 130 MM HG: CPT

## 2024-08-08 PROCEDURE — 96127 BRIEF EMOTIONAL/BEHAV ASSMT: CPT

## 2024-08-08 PROCEDURE — 99214 OFFICE O/P EST MOD 30 MIN: CPT

## 2024-08-08 PROCEDURE — 3078F DIAST BP <80 MM HG: CPT

## 2024-08-08 ASSESSMENT — PATIENT HEALTH QUESTIONNAIRE - PHQ9
6. FEELING BAD ABOUT YOURSELF - OR THAT YOU ARE A FAILURE OR HAVE LET YOURSELF OR YOUR FAMILY DOWN: NOT AL ALL
SUM OF ALL RESPONSES TO PHQ QUESTIONS 1-9: 11
7. TROUBLE CONCENTRATING ON THINGS, SUCH AS READING THE NEWSPAPER OR WATCHING TELEVISION: SEVERAL DAYS
2. FEELING DOWN, DEPRESSED, IRRITABLE, OR HOPELESS: MORE THAN HALF THE DAYS
3. TROUBLE FALLING OR STAYING ASLEEP OR SLEEPING TOO MUCH: NEARLY EVERY DAY
SUM OF ALL RESPONSES TO PHQ9 QUESTIONS 1 AND 2: 3
4. FEELING TIRED OR HAVING LITTLE ENERGY: SEVERAL DAYS
9. THOUGHTS THAT YOU WOULD BE BETTER OFF DEAD, OR OF HURTING YOURSELF: NOT AT ALL
5. POOR APPETITE OR OVEREATING: SEVERAL DAYS
8. MOVING OR SPEAKING SO SLOWLY THAT OTHER PEOPLE COULD HAVE NOTICED. OR THE OPPOSITE, BEING SO FIGETY OR RESTLESS THAT YOU HAVE BEEN MOVING AROUND A LOT MORE THAN USUAL: MORE THAN HALF THE DAYS
1. LITTLE INTEREST OR PLEASURE IN DOING THINGS: SEVERAL DAYS

## 2024-08-08 NOTE — PROGRESS NOTES
Verbal consent was acquired by the patient to use [employer] ambient listening note generation during this visit     Subjective:     HPI:   History of Present Illness  The patient is a 34-year-old female presenting for follow-up.    She initially scheduled this appointment due to concerns about bowel movements. She attributes this to a recent consumption of [employer] and pizza at a friend's house, which resulted in severe intestinal pain and severe diarrhea. She is uncertain if these were due to food poisoning or another condition. Her mother's recent death due to pancreatic cancer, which has increased her paranoia, prompting her to seek medical advice. She is also overdue for all her health appointments. During her last visit, she was concerned about a swollen lymph node and suspected cancer. She requests an STI test due to a new partner, but reports no symptoms. She reports feeling significantly better over the past two days. She is unsure of her last Pap smear but wishes to have it done in-house.    She continues to take Wellbutrin for her depression. She discontinued the recommended psychiatrist appointment due to incompatibility, but has since switched to another psychiatrist, whom she finds more compatible. Her last psychiatrist appointment was on 05/30/2024. She is unsure if her mental health has improved since her last visit, attributing this to the month her mother's death. She is currently seeing Michelle Wells from [facility] biweekly, which she enjoys. She has also undergone EMDR therapy. She forgot to schedule her last psychiatry follow-up. She denies suicidal ideation but admits to struggling with her life. Over the past month, she has experienced two consecutive days of insomnia, followed by a day of insomnia in the past week. She used melatonin for sleep the day her mother's death, which she found helpful. She is not taking a magnesium supplement but continues to take Adderall. She is unsure if  she has experienced brain zaps. She has been struggling with proper food intake and plans to improve this once she is moving with a friend.    She sprained her left ankle while walking into a building, tripped, and hit both hips. She tried to fall but did not react quickly enough, with the right ankle being more affected. Both ankles were severely bruised, and she continues to experience pain. She also broke her thumb and finger, which was expected to heal in 6 months. She wonders if this could be causing her current pain. She had x-rays of her finger and ankles done at urgent care due to increased pain. Two nights ago, she heard a large crack in her ankle, which has since been slightly relieved, but she still experiences pain.    FAMILY HISTORY  Her mother  of pancreatic cancer. Her mother was heavy alcohol, heavy smoking, and heavy working. Her grandmother had breast cancer.    Depression Screening    Little interest or pleasure in doing things?   Several days  Feeling down, depressed , or hopeless?  More than half the days  Trouble falling or staying asleep, or sleeping too much?   Nearly every day  Feeling tired or having little energy?   Several days  Poor appetite or overeating?   Several days  Feeling bad about yourself - or that you are a failure or have let yourself or your family down?  Not al all  Trouble concentrating on things, such as reading the newspaper or watching television?  Several days  Moving or speaking so slowly that other people could have noticed.  Or the opposite - being so fidgety or restless that you have been moving around a lot more than usual?   More than half the days  Thoughts that you would be better off dead, or of hurting yourself?   Not at all  Patient Health Questionnaire Score:  (!) 11      If depressive symptoms identified deferred to follow up visit unless specifically addressed in assesment and plan.    Interpretation of PHQ-9 Total Score   Score Severity   1-4 No  Depression   5-9 Mild Depression   10-14 Moderate Depression   15-19 Moderately Severe Depression   20-27 Severe Depression      Assessment & Plan:     Problem List Items Addressed This Visit       Recurrent major depressive disorder, in partial remission (HCC)    Chronic pain of right ankle    Relevant Orders    DX-FOOT-COMPLETE 3+ RIGHT     Other Visit Diagnoses       Genetic screening        Relevant Orders    Referral to Genetic Research Studies    Screening for endocrine, nutritional, metabolic and immunity disorder        Relevant Orders    CBC WITHOUT DIFFERENTIAL    Lipid Profile    TSH WITH REFLEX TO FT4    Comp Metabolic Panel    Screening for HIV (human immunodeficiency virus)        Need for hepatitis C screening test        Screen for sexually transmitted diseases        Relevant Orders    HIV AG/AB Combo Assay Screening    T.Pallidum AB ALBERTO (Screening)    Hepatitis C Virus Antibody    HEP B Surface Antibody    Hep B Core AB Total    Hep B Surface Antigen    Chlamydia/GC, PCR (Urine)              1. Recurrent major depressive disorder, in partial remission (HCC)  Chronic and ongoing   Managed by psychiatry, continue wellbutrin as prescribed, recommend additional magnesium supplementation. (Mag gluconate or threonate 400mg daily)    2. Chronic pain of right ankle  Chronic and ongoing   Limited eversion of right foot and pain over base of fourth metatarsal, rule out fracture  - DX-FOOT-COMPLETE 3+ RIGHT; Future    3. Genetic screening  - Referral to Genetic Research Studies    4. Screening for endocrine, nutritional, metabolic and immunity disorder  - CBC WITHOUT DIFFERENTIAL; Future  - Lipid Profile; Future  - TSH WITH REFLEX TO FT4; Future  - Comp Metabolic Panel; Future    5. Screening for HIV (human immunodeficiency virus)  6. Need for hepatitis C screening test  7. Screen for sexually transmitted diseases  - HIV AG/AB Combo Assay Screening; Future  - T.Pallidum AB ALBERTO (Screening); Future  - Hepatitis  C Virus Antibody; Future  - HEP B Surface Antibody; Future  - Hep B Core AB Total; Future  - Hep B Surface Antigen; Future  - Chlamydia/GC, PCR (Urine); Future        Health Maintenance: Orders placed as applicable to patient     Objective:     Exam:  Objective:  Vitals:    08/08/24 0829   BP: 108/72   Pulse: 83   Resp: 14   Temp: 36.2 °C (97.2 °F)   SpO2: 100%     Physical Exam  Physical Exam  Musculoskeletal:      Right foot: Decreased range of motion. Tenderness present.        Legs:          Constitutional:       Appearance: Normal appearance.   Eyes:      Extraocular Movements: Extraocular movements intact.   Pulmonary:      Effort: Pulmonary effort is normal.   Neurological:      General: No focal deficit present.      Mental Status: She is alert and oriented to person, place, and time.   Psychiatric:         Mood and Affect: Mood normal.         Behavior: Behavior normal.       Return in about 6 weeks (around 9/19/2024) for AWV.    Please note that this dictation was created using voice recognition software. I have made every reasonable attempt to correct obvious errors, but I expect that there are errors of grammar and possibly content that I did not discover before finalizing the note.

## 2024-08-13 ENCOUNTER — APPOINTMENT (OUTPATIENT)
Dept: RADIOLOGY | Facility: MEDICAL CENTER | Age: 34
End: 2024-08-13
Payer: COMMERCIAL

## 2024-08-13 DIAGNOSIS — G89.29 CHRONIC PAIN OF RIGHT ANKLE: ICD-10-CM

## 2024-08-13 DIAGNOSIS — M25.571 CHRONIC PAIN OF RIGHT ANKLE: ICD-10-CM

## 2024-08-13 PROCEDURE — 73630 X-RAY EXAM OF FOOT: CPT | Mod: RT

## 2024-08-16 ENCOUNTER — RESEARCH ENCOUNTER (OUTPATIENT)
Dept: RESEARCH | Facility: MEDICAL CENTER | Age: 34
End: 2024-08-16
Payer: COMMERCIAL

## 2024-08-16 DIAGNOSIS — Z00.6 RESEARCH STUDY PATIENT: ICD-10-CM

## 2024-08-16 NOTE — RESEARCH NOTE
Patient is eligible for the following studies: HNP. The consent form(s) have been pushed to the patient's MyChart for the virtual encounter. Sent patient a [email or web address] message.

## 2024-08-16 NOTE — RESEARCH NOTE
Confirmed with the participant which designated provider they would like study results shared with (Adolph Cervantes). Patient will have an opportunity to share the results with any providers of their choosing in the future by accessing their results from [employer].

## 2024-09-12 ENCOUNTER — HOSPITAL ENCOUNTER (OUTPATIENT)
Facility: MEDICAL CENTER | Age: 34
End: 2024-09-12
Payer: COMMERCIAL

## 2024-09-12 ENCOUNTER — OFFICE VISIT (OUTPATIENT)
Dept: MEDICAL GROUP | Facility: PHYSICIAN GROUP | Age: 34
End: 2024-09-12
Payer: COMMERCIAL

## 2024-09-12 VITALS
HEIGHT: 72 IN | SYSTOLIC BLOOD PRESSURE: 106 MMHG | BODY MASS INDEX: 26.28 KG/M2 | OXYGEN SATURATION: 99 % | WEIGHT: 194 LBS | HEART RATE: 88 BPM | RESPIRATION RATE: 19 BRPM | DIASTOLIC BLOOD PRESSURE: 72 MMHG | TEMPERATURE: 98.2 F

## 2024-09-12 DIAGNOSIS — Z12.4 SCREENING FOR CERVICAL CANCER: ICD-10-CM

## 2024-09-12 DIAGNOSIS — Z23 NEED FOR VACCINATION: ICD-10-CM

## 2024-09-12 DIAGNOSIS — Z11.51 SCREENING FOR HPV (HUMAN PAPILLOMAVIRUS): ICD-10-CM

## 2024-09-12 DIAGNOSIS — Z01.419 ENCOUNTER FOR GYNECOLOGICAL EXAMINATION: ICD-10-CM

## 2024-09-12 DIAGNOSIS — Z00.00 WELLNESS EXAMINATION: ICD-10-CM

## 2024-09-12 PROCEDURE — 99459 PELVIC EXAMINATION: CPT

## 2024-09-12 PROCEDURE — 3074F SYST BP LT 130 MM HG: CPT

## 2024-09-12 PROCEDURE — 90715 TDAP VACCINE 7 YRS/> IM: CPT

## 2024-09-12 PROCEDURE — 90472 IMMUNIZATION ADMIN EACH ADD: CPT

## 2024-09-12 PROCEDURE — 90471 IMMUNIZATION ADMIN: CPT

## 2024-09-12 PROCEDURE — 3078F DIAST BP <80 MM HG: CPT

## 2024-09-12 PROCEDURE — 88175 CYTOPATH C/V AUTO FLUID REDO: CPT

## 2024-09-12 PROCEDURE — 99395 PREV VISIT EST AGE 18-39: CPT | Mod: 25

## 2024-09-12 PROCEDURE — 90746 HEPB VACCINE 3 DOSE ADULT IM: CPT

## 2024-09-12 PROCEDURE — 87624 HPV HI-RISK TYP POOLED RSLT: CPT

## 2024-09-12 NOTE — PROGRESS NOTES
Subjective:     CC:   Chief Complaint   Patient presents with    Annual Exam     Pap and breast exam        HPI:   Isadora Emmanuel is a 34 y.o. female who presents for annual exam. She is feeling well and denies any complaints.    Ob-Gyn/ History:    Patient has GYN provider: no  /Para:  G0  Last Pap Smear:  unknown. No history of abnormal pap smears.  Gyn Surgery:  None.  Current Contraceptive Method:  NA. Is currently sexually active with female partner.  Last menstrual period:  .  Periods regular. light bleeding. Cramping is mild.   She do not take OTC analgesics for cramps.  No significant bloating/fluid retention, pelvic pain, or dyspareunia. No vaginal discharge  Post-menopausal bleeding: None  Urinary incontinence: NA  Folate intake: NA     Health Maintenance  Advanced directive: NA   Osteoporosis Screen/ DEXA: NA   PT/vit D for falls prevention: NA   Cholesterol Screening: NA   Diabetes Screening: NA   Aspirin Use: NA      Anticipatory Guidance  Diet: Counseled, girlfriend is cooking meals   Exercise: Occasional   Substance Abuse: None   Safe in relationship.   Seat belts, bike helmet, gun safety discussed.  Sun protection used.    Cancer screening  Colorectal Cancer Screening: Couseled, 45    Lung Cancer Screening: NA    Cervical Cancer Screening: UTD   Breast Cancer Screening: Counseled, 50     Infectious disease screening/Immunizations  --STI Screening: Declines   --Practices safe sex.  --HIV Screening: NA   --Hepatitis C Screening: NA   --Immunizations:    Influenza: NA    HPV:  NA    Tetanus:  Given   Shingles: n/a    Pneumococcal : NA       Other immunizations: Hep B counseled     She  has no past medical history on file.  She  has a past surgical history that includes repair, knee, acl (Left).    Family History   Problem Relation Age of Onset    Pancreatic Cancer Mother     Breast Cancer Maternal Grandmother        Social History     Socioeconomic History    Marital status:  Single     Spouse name: Not on file    Number of children: Not on file    Years of education: Not on file    Highest education level: Not on file   Occupational History    Not on file   Tobacco Use    Smoking status: Former    Smokeless tobacco: Never   Vaping Use    Vaping status: Every Day    Substances: Nicotine, Flavoring    Devices: Refillable tank   Substance and Sexual Activity    Alcohol use: Yes     Alcohol/week: 3.6 oz     Types: 6 Standard drinks or equivalent per week    Drug use: Never    Sexual activity: Yes     Partners: Female   Other Topics Concern    Not on file   Social History Narrative    Not on file     Social Determinants of Health     Financial Resource Strain: Not on file   Food Insecurity: Not on file   Transportation Needs: Not on file   Physical Activity: Not on file   Stress: Not on file   Social Connections: Not on file   Intimate Partner Violence: Not on file   Housing Stability: Not on file       Patient Active Problem List    Diagnosis Date Noted    Chronic pain of right ankle 08/08/2024    Pain and swelling of toe of right foot 09/26/2022    History of COVID-19 06/06/2022    Tendinitis, de Quervain's 03/11/2022    History of repair of ACL 03/11/2022    History of abnormal cervical Pap smear 03/11/2022    ADHD 03/11/2022    Recurrent major depressive disorder, in partial remission (HCC) 03/11/2022    Vapes nicotine containing substance 03/11/2022         Current Outpatient Medications   Medication Sig Dispense Refill    amphetamine-dextroamphetamine (ADDERALL XR) 20 MG per XR capsule       amphetamine-dextroamphetamine (ADDERALL) 20 MG Tab       buPROPion (WELLBUTRIN XL) 150 MG XL tablet Take 1 Tablet by mouth every morning. 90 Tablet 0     No current facility-administered medications for this visit.     No Known Allergies    Review of Systems   Constitutional: Negative for fever, chills and malaise/fatigue.   HENT: Negative for congestion.    Eyes: Negative for pain.   Respiratory:  Negative for cough and shortness of breath.    Cardiovascular: Negative for leg swelling.   Gastrointestinal: Negative for nausea, vomiting, abdominal pain and diarrhea.   Genitourinary: Negative for dysuria and hematuria.   Skin: Negative for rash.   Neurological: Negative for dizziness, focal weakness and headaches.   Endo/Heme/Allergies: Does not bruise/bleed easily.   Psychiatric/Behavioral: Negative for depression.  The patient is not nervous/anxious.      Objective:     /72   Pulse 88   Temp 36.8 °C (98.2 °F) (Temporal)   Resp 19   Ht 1.829 m (6')   Wt 88 kg (194 lb)   LMP 08/27/2024   SpO2 99%   BMI 26.31 kg/m²   Body mass index is 26.31 kg/m².  Wt Readings from Last 4 Encounters:   09/12/24 88 kg (194 lb)   08/08/24 86 kg (189 lb 9.6 oz)   06/25/24 87.5 kg (193 lb)   06/22/24 87.8 kg (193 lb 9.6 oz)       Physical Exam:  Constitutional: Well-developed and well-nourished. Not diaphoretic. No distress.   Skin: Skin is warm and dry. No rash noted.  Head: Atraumatic without lesions.  Eyes: Conjunctivae and extraocular motions are normal. Pupils are equal, round, and reactive to light. No scleral icterus.   Ears:  External ears unremarkable. Tympanic membranes clear and intact.  Nose: Nares patent. Septum midline. Turbinates without erythema nor edema. No discharge.   Mouth/Throat: Dentition is intact. Tongue normal. Oropharynx is clear and moist. Posterior pharynx without erythema or exudates.  Neck: Supple, trachea midline. Normal range of motion. No thyromegaly present. No lymphadenopathy--cervical or supraclavicular.  Cardiovascular: Regular rate and rhythm, S1 and S2 without murmur, rubs, or gallops.  Lungs: Normal inspiratory effort, CTA bilaterally, no wheezes/rhonchi/rales  Breast: Breasts examined seated and supine. No skin changes, peau d'orange or nipple retraction. No discharge. No axillary or supraclavicular adenopathy. No masses or nodularity palpable.   Abdomen: Soft, non tender, and  without distention. Active bowel sounds in all four quadrants. No rebound, guarding, masses or HSM.  :Perineum and external genitalia normal without rash. Vagina with normal and physiologic discharge. Cervix without visible lesions or discharge. Bimanual exam without adnexal masses or cervical motion tenderness.  Extremities: No cyanosis, clubbing, erythema, nor edema. Distal pulses intact and symmetric.   Musculoskeletal: All major joints AROM full in all directions without pain.  Neurological: Alert and oriented x 3. DTRs 2+/3 and symmetric. No cranial nerve deficit. 5/5 myotomes. Sensation intact.   Psychiatric:  Behavior, mood, and affect are appropriate.    A chaperone was offered to the patient during today's exam. Chaperone name: Ayaka was present.    Assessment and Plan:     Problem List Items Addressed This Visit    None  Visit Diagnoses       Screening for cervical cancer        Relevant Orders    Thinprep Pap with HPV    Encounter for gynecological examination        Relevant Orders    Thinprep Pap with HPV    Screening for HPV (human papillomavirus)        Relevant Orders    Thinprep Pap with HPV    Wellness examination        Need for vaccination        Relevant Orders    Tdap Vaccine =>6YO IM (Completed)    Hepatitis B Vaccine Adult 20+ (Completed)            HCM:  Up to date   Labs per orders  Immunizations per orders  Patient counseled about skin care, diet, supplements, prenatal vitamins, safe sex and exercise.      Follow-up: Return in about 1 year (around 9/12/2025) for AWV.

## 2024-09-16 ENCOUNTER — HOSPITAL ENCOUNTER (OUTPATIENT)
Dept: LAB | Facility: MEDICAL CENTER | Age: 34
End: 2024-09-16

## 2024-09-16 ENCOUNTER — HOSPITAL ENCOUNTER (OUTPATIENT)
Dept: LAB | Facility: MEDICAL CENTER | Age: 34
End: 2024-09-16
Payer: COMMERCIAL

## 2024-09-16 DIAGNOSIS — Z11.3 SCREEN FOR SEXUALLY TRANSMITTED DISEASES: ICD-10-CM

## 2024-09-16 DIAGNOSIS — Z13.0 SCREENING FOR ENDOCRINE, NUTRITIONAL, METABOLIC AND IMMUNITY DISORDER: ICD-10-CM

## 2024-09-16 DIAGNOSIS — Z13.228 SCREENING FOR ENDOCRINE, NUTRITIONAL, METABOLIC AND IMMUNITY DISORDER: ICD-10-CM

## 2024-09-16 DIAGNOSIS — Z13.29 SCREENING FOR ENDOCRINE, NUTRITIONAL, METABOLIC AND IMMUNITY DISORDER: ICD-10-CM

## 2024-09-16 DIAGNOSIS — Z13.21 SCREENING FOR ENDOCRINE, NUTRITIONAL, METABOLIC AND IMMUNITY DISORDER: ICD-10-CM

## 2024-09-16 DIAGNOSIS — Z00.6 RESEARCH STUDY PATIENT: ICD-10-CM

## 2024-09-16 LAB
ALBUMIN SERPL BCP-MCNC: 3.8 G/DL (ref 3.2–4.9)
ALBUMIN/GLOB SERPL: 1.4 G/DL
ALP SERPL-CCNC: 66 U/L (ref 30–99)
ALT SERPL-CCNC: 10 U/L (ref 2–50)
ANION GAP SERPL CALC-SCNC: 10 MMOL/L (ref 7–16)
AST SERPL-CCNC: 20 U/L (ref 12–45)
BILIRUB SERPL-MCNC: 0.5 MG/DL (ref 0.1–1.5)
BUN SERPL-MCNC: 9 MG/DL (ref 8–22)
C TRACH DNA SPEC QL NAA+PROBE: NEGATIVE
CALCIUM ALBUM COR SERPL-MCNC: 9 MG/DL (ref 8.5–10.5)
CALCIUM SERPL-MCNC: 8.8 MG/DL (ref 8.5–10.5)
CHLORIDE SERPL-SCNC: 105 MMOL/L (ref 96–112)
CHOLEST SERPL-MCNC: 154 MG/DL (ref 100–199)
CO2 SERPL-SCNC: 24 MMOL/L (ref 20–33)
CREAT SERPL-MCNC: 0.83 MG/DL (ref 0.5–1.4)
ERYTHROCYTE [DISTWIDTH] IN BLOOD BY AUTOMATED COUNT: 42.3 FL (ref 35.9–50)
FASTING STATUS PATIENT QL REPORTED: NORMAL
GFR SERPLBLD CREATININE-BSD FMLA CKD-EPI: 95 ML/MIN/1.73 M 2
GLOBULIN SER CALC-MCNC: 2.7 G/DL (ref 1.9–3.5)
GLUCOSE SERPL-MCNC: 88 MG/DL (ref 65–99)
HBV CORE AB SERPL QL IA: NONREACTIVE
HBV SURFACE AB SERPL IA-ACNC: 7.58 MIU/ML (ref 0–10)
HBV SURFACE AG SER QL: NORMAL
HCT VFR BLD AUTO: 42.9 % (ref 37–47)
HCV AB SER QL: NORMAL
HDLC SERPL-MCNC: 61 MG/DL
HGB BLD-MCNC: 14.1 G/DL (ref 12–16)
HIV 1+2 AB+HIV1 P24 AG SERPL QL IA: NORMAL
LDLC SERPL CALC-MCNC: 82 MG/DL
MCH RBC QN AUTO: 30.4 PG (ref 27–33)
MCHC RBC AUTO-ENTMCNC: 32.9 G/DL (ref 32.2–35.5)
MCV RBC AUTO: 92.5 FL (ref 81.4–97.8)
N GONORRHOEA DNA SPEC QL NAA+PROBE: NEGATIVE
PLATELET # BLD AUTO: 246 K/UL (ref 164–446)
PMV BLD AUTO: 11 FL (ref 9–12.9)
POTASSIUM SERPL-SCNC: 4.1 MMOL/L (ref 3.6–5.5)
PROT SERPL-MCNC: 6.5 G/DL (ref 6–8.2)
RBC # BLD AUTO: 4.64 M/UL (ref 4.2–5.4)
SODIUM SERPL-SCNC: 139 MMOL/L (ref 135–145)
SPECIMEN SOURCE: NORMAL
T PALLIDUM AB SER QL IA: NORMAL
TRIGL SERPL-MCNC: 57 MG/DL (ref 0–149)
TSH SERPL DL<=0.005 MIU/L-ACNC: 2.25 UIU/ML (ref 0.38–5.33)
WBC # BLD AUTO: 7.3 K/UL (ref 4.8–10.8)

## 2024-09-16 PROCEDURE — 86706 HEP B SURFACE ANTIBODY: CPT

## 2024-09-16 PROCEDURE — 87591 N.GONORRHOEAE DNA AMP PROB: CPT

## 2024-09-16 PROCEDURE — 84443 ASSAY THYROID STIM HORMONE: CPT

## 2024-09-16 PROCEDURE — 86704 HEP B CORE ANTIBODY TOTAL: CPT

## 2024-09-16 PROCEDURE — 86780 TREPONEMA PALLIDUM: CPT

## 2024-09-16 PROCEDURE — 87389 HIV-1 AG W/HIV-1&-2 AB AG IA: CPT

## 2024-09-16 PROCEDURE — 87340 HEPATITIS B SURFACE AG IA: CPT

## 2024-09-16 PROCEDURE — 86803 HEPATITIS C AB TEST: CPT

## 2024-09-16 PROCEDURE — 85027 COMPLETE CBC AUTOMATED: CPT

## 2024-09-16 PROCEDURE — 36415 COLL VENOUS BLD VENIPUNCTURE: CPT

## 2024-09-16 PROCEDURE — 80053 COMPREHEN METABOLIC PANEL: CPT

## 2024-09-16 PROCEDURE — 80061 LIPID PANEL: CPT

## 2024-09-16 PROCEDURE — 87491 CHLMYD TRACH DNA AMP PROBE: CPT

## 2024-09-17 LAB
CYTOLOGIST CVX/VAG CYTO: NORMAL
CYTOLOGY CVX/VAG DOC CYTO: NORMAL
CYTOLOGY CVX/VAG DOC THIN PREP: NORMAL
HPV I/H RISK 4 DNA CVX QL PROBE+SIG AMP: NEGATIVE
NOTE NL11727A: NORMAL
OTHER STN SPEC: NORMAL
STAT OF ADQ CVX/VAG CYTO-IMP: NORMAL

## 2024-09-28 LAB
APOB+LDLR+PCSK9 GENE MUT ANL BLD/T: NOT DETECTED
BRCA1+BRCA2 DEL+DUP + FULL MUT ANL BLD/T: NOT DETECTED
MLH1+MSH2+MSH6+PMS2 GN DEL+DUP+FUL M: NOT DETECTED

## 2024-10-14 ENCOUNTER — NON-PROVIDER VISIT (OUTPATIENT)
Dept: MEDICAL GROUP | Facility: PHYSICIAN GROUP | Age: 34
End: 2024-10-14
Payer: COMMERCIAL

## 2024-10-14 DIAGNOSIS — Z23 NEED FOR VACCINATION: ICD-10-CM

## 2024-10-14 PROCEDURE — 90471 IMMUNIZATION ADMIN: CPT | Performed by: INTERNAL MEDICINE

## 2024-10-14 PROCEDURE — 90746 HEPB VACCINE 3 DOSE ADULT IM: CPT | Performed by: INTERNAL MEDICINE

## 2025-01-07 ENCOUNTER — OFFICE VISIT (OUTPATIENT)
Dept: URGENT CARE | Facility: PHYSICIAN GROUP | Age: 35
End: 2025-01-07
Payer: COMMERCIAL

## 2025-01-07 VITALS
SYSTOLIC BLOOD PRESSURE: 106 MMHG | WEIGHT: 194 LBS | TEMPERATURE: 98.2 F | HEIGHT: 72 IN | OXYGEN SATURATION: 97 % | BODY MASS INDEX: 26.28 KG/M2 | HEART RATE: 86 BPM | DIASTOLIC BLOOD PRESSURE: 68 MMHG | RESPIRATION RATE: 16 BRPM

## 2025-01-07 DIAGNOSIS — S39.012A STRAIN OF LUMBAR REGION, INITIAL ENCOUNTER: ICD-10-CM

## 2025-01-07 PROCEDURE — 3078F DIAST BP <80 MM HG: CPT | Performed by: PHYSICIAN ASSISTANT

## 2025-01-07 PROCEDURE — 3074F SYST BP LT 130 MM HG: CPT | Performed by: PHYSICIAN ASSISTANT

## 2025-01-07 PROCEDURE — 99213 OFFICE O/P EST LOW 20 MIN: CPT | Performed by: PHYSICIAN ASSISTANT

## 2025-01-07 RX ORDER — CYCLOBENZAPRINE HCL 10 MG
10 TABLET ORAL 3 TIMES DAILY PRN
Qty: 30 TABLET | Refills: 0 | Status: SHIPPED | OUTPATIENT
Start: 2025-01-07

## 2025-01-07 RX ORDER — KETOROLAC TROMETHAMINE 15 MG/ML
15 INJECTION, SOLUTION INTRAMUSCULAR; INTRAVENOUS ONCE
Status: COMPLETED | OUTPATIENT
Start: 2025-01-07 | End: 2025-01-07

## 2025-01-07 RX ADMIN — KETOROLAC TROMETHAMINE 15 MG: 15 INJECTION, SOLUTION INTRAMUSCULAR; INTRAVENOUS at 08:56

## 2025-01-07 ASSESSMENT — ENCOUNTER SYMPTOMS
FALLS: 0
DIZZINESS: 0
HEADACHES: 0
MYALGIAS: 1
CHILLS: 0
FEVER: 0
TINGLING: 0
BACK PAIN: 1
FLANK PAIN: 0
WEAKNESS: 0

## 2025-01-07 ASSESSMENT — FIBROSIS 4 INDEX: FIB4 SCORE: 0.87

## 2025-01-07 NOTE — PROGRESS NOTES
Subjective:     CHIEF COMPLAINT  Chief Complaint   Patient presents with    Back Pain     Back spasms x1 day. Was moving a dresser and felt her back pull as she was setting the dresser down. Feels worse now than when it happened. Pain was somewhat alleviated by movement. Took naproxen 400mg yesterday to help alleviate, none today.        HPI  Isadora Emmanuel is a very pleasant 34 y.o. female who presents to clinic with low back pain x 1 day.  Patient states she has dealt with chronic low back pain for multiple years.  Experiences flareups now and then.  Yesterday she was moving a dresser.  When she went to set the dresser down she immediately felt pain to the low back.  Currently experiencing pain over the bilateral lumbar region.  Pain is a constant dull ache and muscle spasm sensation.  Denies any radicular symptoms to lower extremities.  No change in bowel or bladder function.  No saddle anesthesia.  Took Aleve yesterday which provided some relief.    REVIEW OF SYSTEMS  Review of Systems   Constitutional:  Negative for chills, fever and malaise/fatigue.   Genitourinary:  Negative for dysuria, flank pain, frequency, hematuria and urgency.   Musculoskeletal:  Positive for back pain and myalgias. Negative for falls.   Neurological:  Negative for dizziness, tingling, weakness and headaches.       PAST MEDICAL HISTORY  Patient Active Problem List    Diagnosis Date Noted    Chronic pain of right ankle 08/08/2024    Pain and swelling of toe of right foot 09/26/2022    History of COVID-19 06/06/2022    Tendinitis, de Quervain's 03/11/2022    History of repair of ACL 03/11/2022    History of abnormal cervical Pap smear 03/11/2022    ADHD 03/11/2022    Recurrent major depressive disorder, in partial remission (HCC) 03/11/2022    Vapes nicotine containing substance 03/11/2022       SURGICAL HISTORY   has a past surgical history that includes repair, knee, acl (Left).    ALLERGIES  No Known Allergies    CURRENT  MEDICATIONS  Home Medications       Reviewed by Korey Guevara P.A.-C. (Physician Assistant) on 01/07/25 at 0842  Med List Status: <None>     Medication Last Dose Status   amphetamine-dextroamphetamine (ADDERALL XR) 20 MG per XR capsule Taking Active   amphetamine-dextroamphetamine (ADDERALL) 20 MG Tab Not Taking Flagged for Removal   buPROPion (WELLBUTRIN XL) 150 MG XL tablet Taking Active                    SOCIAL HISTORY  Social History     Tobacco Use    Smoking status: Former    Smokeless tobacco: Never   Vaping Use    Vaping status: Every Day    Substances: Nicotine, Flavoring    Devices: Refillable tank   Substance and Sexual Activity    Alcohol use: Yes     Alcohol/week: 3.6 oz     Types: 6 Standard drinks or equivalent per week    Drug use: Never    Sexual activity: Yes     Partners: Female       FAMILY HISTORY  Family History   Problem Relation Age of Onset    Pancreatic Cancer Mother     Breast Cancer Maternal Grandmother           Objective:     VITAL SIGNS: /68 (BP Location: Right arm, Patient Position: Standing, BP Cuff Size: Adult)   Pulse 86   Temp 36.8 °C (98.2 °F) (Temporal)   Resp 16   Ht 1.829 m (6') Comment: Pt reported  Wt 88 kg (194 lb 0.1 oz)   SpO2 97%   BMI 26.31 kg/m²     PHYSICAL EXAM  Physical Exam  Constitutional:       General: She is not in acute distress.     Appearance: Normal appearance. She is not ill-appearing, toxic-appearing or diaphoretic.   HENT:      Head: Normocephalic and atraumatic.   Eyes:      Conjunctiva/sclera: Conjunctivae normal.   Pulmonary:      Effort: Pulmonary effort is normal.   Musculoskeletal:      Cervical back: Normal range of motion.      Comments: Lumbar exam: No midline lumbar tenderness to palpation.  No obvious deformity or step-off.  Patient has tenderness to bilateral lumbar paraspinal muscles.  Lumbar range of motion limited in all directions secondary to pain.  Lower extremity strength and sensation full and intact.  Slowed gait.    Neurological:      General: No focal deficit present.      Mental Status: She is alert and oriented to person, place, and time. Mental status is at baseline.         Assessment/Plan:     1. Strain of lumbar region, initial encounter  - ketorolac (Toradol) 15 MG/ML injection 15 mg  - cyclobenzaprine (FLEXERIL) 10 mg Tab; Take 1 Tablet by mouth 3 times a day as needed for Muscle Spasms or Moderate Pain.  Dispense: 30 Tablet; Refill: 0      MDM/Comments:    -Take medication as prescribed. Discussed sedative effects of muscle relaxers.  -Can take OTC Tylenol as directed for pain.   -Temperature Therapy: Heat or ice, whichever feels better.  -Gentle ROM back stretches and exercises. Resume activity as tolerated.  -Follow up for persistent, new, or worsening pain. Emergently for weakness, loss of bowel or bladder, groin pain or numbness, fevers.      Differential diagnosis, natural history, supportive care, and indications for immediate follow-up discussed. All questions answered. Patient agrees with the plan of care.    Follow-up as needed if symptoms worsen or fail to improve to PCP, Urgent care or Emergency Room.    I have personally reviewed prior external notes and test results pertinent to today's visit.  I have independently reviewed and interpreted all diagnostics ordered during this urgent care acute visit.   Discussed management options (risks,benefits, and alternatives to treatment). Pt expresses understanding and the treatment plan was agreed upon. Questions were encouraged and answered to pt's satisfaction.    Please note that this dictation was created using voice recognition software. I have made a reasonable attempt to correct obvious errors, but I expect that there are errors of grammar and possibly content that I did not discover before finalizing the note.

## 2025-01-07 NOTE — LETTER
January 7, 2025    To Whom It May Concern:         This is confirmation that Isadora Emmanuel attended her scheduled appointment with Korey Guevara P.A.-C. on 1/07/25.  Please excuse the patient's absence from work on 1/7/2025.         If you have any questions please do not hesitate to call me at the phone number listed below.    Sincerely,          Korey Guevara P.A.-C.  [phone]

## 2025-05-21 ENCOUNTER — OFFICE VISIT (OUTPATIENT)
Dept: URGENT CARE | Facility: PHYSICIAN GROUP | Age: 35
End: 2025-05-21
Payer: COMMERCIAL

## 2025-05-21 ENCOUNTER — HOSPITAL ENCOUNTER (OUTPATIENT)
Dept: RADIOLOGY | Facility: MEDICAL CENTER | Age: 35
End: 2025-05-21
Payer: COMMERCIAL

## 2025-05-21 VITALS
DIASTOLIC BLOOD PRESSURE: 78 MMHG | TEMPERATURE: 98.1 F | HEIGHT: 71 IN | RESPIRATION RATE: 18 BRPM | OXYGEN SATURATION: 98 % | BODY MASS INDEX: 26.88 KG/M2 | WEIGHT: 192 LBS | SYSTOLIC BLOOD PRESSURE: 114 MMHG | HEART RATE: 85 BPM

## 2025-05-21 DIAGNOSIS — S86.912A KNEE STRAIN, LEFT, INITIAL ENCOUNTER: Primary | ICD-10-CM

## 2025-05-21 DIAGNOSIS — Z98.890 HISTORY OF REPAIR OF ACL: ICD-10-CM

## 2025-05-21 DIAGNOSIS — S89.91XA RIGHT KNEE INJURY, INITIAL ENCOUNTER: ICD-10-CM

## 2025-05-21 PROCEDURE — 3074F SYST BP LT 130 MM HG: CPT

## 2025-05-21 PROCEDURE — 3078F DIAST BP <80 MM HG: CPT

## 2025-05-21 PROCEDURE — 73564 X-RAY EXAM KNEE 4 OR MORE: CPT | Mod: LT

## 2025-05-21 PROCEDURE — 99214 OFFICE O/P EST MOD 30 MIN: CPT

## 2025-05-21 ASSESSMENT — FIBROSIS 4 INDEX: FIB4 SCORE: 0.9

## 2025-05-21 NOTE — Clinical Note
REFERRAL APPROVAL NOTICE         Sent on May 21, 2025                   Isadora Maulik Emmanuel  [address]  [place] [address]                   Dear Ms. Emmanuel,    After a careful review of the medical information and benefit coverage, [facility] has processed your referral. See below for additional details.    If applicable, you must be actively enrolled with your insurance for coverage of the authorized service. If you have any questions regarding your coverage, please contact your insurance directly.    REFERRAL INFORMATION   Referral #:  [id number]  Referred-To Department    Referred-By Provider:  Orthopedics    YESY Flower   Department Of Surgery      [address]  [address]  [facility] [id number] [phone] [address], [address]  [facility] [address] [phone]    Referral Start Date:  05/21/2025  Referral End Date:   05/21/2026             SCHEDULING  If you do not already have an appointment, please call [phone] to make an appointment.     MORE INFORMATION  If you do not already have a [facility] account, sign up at: [email or web address]  You can access your medical information, make appointments, see lab results, billing information, and more.  If you have questions regarding this referral, please contact  the [facility] Referrals department at:             [phone]. Monday - Friday 8:00AM - 5:00PM.     Sincerely,    [facility]

## 2025-05-21 NOTE — PROGRESS NOTES
"Subjective:   Isadora Emmanuel is a 35 y.o. female    Patient presents to the clinic for complaints of left knee injury x 1 day.  Patient was playing softball yesterday when her left knee hyperextended while she was backpedaling, causing her to fall due to the pain.  Per patient, it feels like the knee pulled apart during that time of injury. At this time, she is having pain with bending the right knee as well as with walking and ambulating.  Hx of left ACL tear and repair.   Has been icing the area.   Patient denies chest pain, SOB, dizziness/lightheadedness/vertigo, nausea/vomiting/diarrhea, difficulty breathing or swallowing, palpitations or racing heart rate, Numbness, tingling, shooting pain.      Knee Injury        ROS  Refer to [facility] for additional details.    During this visit, appropriate PPE was worn, and hand hygiene was performed.    PMH:  has no past medical history on file.    MEDS: Current Medications[1]    ALLERGIES: Allergies[2]  SURGHX: Past Surgical History[3]  SOCHX:  reports that she has been smoking cigarettes. She has never used smokeless tobacco. She reports current alcohol use of about 3.6 oz of alcohol per week. She reports that she does not use drugs.    FH: Per HPI as applicable/pertinent.    Medications, Allergies, and current problem list reviewed today in Epic.     Objective:     /78 (BP Location: Right arm, Patient Position: Sitting, BP Cuff Size: Adult long)   Pulse 85   Temp 36.7 °C (98.1 °F) (Temporal)   Resp 18   Ht 1.803 m (5' 11\")   Wt 87.1 kg (192 lb)   SpO2 98%     Physical Exam  Constitutional:       Appearance: Normal appearance. She is not ill-appearing.   HENT:      Right Ear: External ear normal.      Left Ear: External ear normal.   Cardiovascular:      Rate and Rhythm: Normal rate and regular rhythm.      Pulses: Normal pulses.   Pulmonary:      Effort: Pulmonary effort is normal.   Abdominal:      General: Abdomen is flat.   Musculoskeletal:      " Left knee: Swelling (To the medial aspect) and crepitus (To palpation of the patellar tendon) present. No deformity, effusion, erythema, ecchymosis, lacerations or bony tenderness. Decreased range of motion (Decreased active range of motion due to pain, 100% full range of motion passively.). Tenderness (To the medial aspect of the knee) present.      Comments: Patient with negative posterior drawer's test, but questionable laxity on anterior drawer's test - patient unable to tolerate anterior drawer's test when performed due to excruciating pain.   Skin:     General: Skin is warm and dry.      Capillary Refill: Capillary refill takes less than 2 seconds.   Neurological:      General: No focal deficit present.      Mental Status: She is alert and oriented to person, place, and time.   Psychiatric:         Mood and Affect: Mood normal.         Behavior: Behavior normal.         Thought Content: Thought content normal.         Judgment: Judgment normal.       DX-KNEE COMPLETE 4+ LEFT  Result Date: 5/21/2025 5/21/2025 1:02 PM HISTORY/REASON FOR EXAM: Left knee pain. TECHNIQUE/EXAM DESCRIPTION AND NUMBER OF VIEWS: 4 views of the LEFT knee. COMPARISON: None FINDINGS: Bone density is normal. There is no evidence of fracture or dislocation. There are minimal tricompartment osteophytes. There is surgical change consistent with prior anterior cruciate ligament graft repair. There is no joint effusion.     1.  No evidence of acute fracture or dislocation. 2.  Minimal tricompartment degenerative change. 3.  Prior anterior cruciate ligament graft repair.    Assessment/Plan:     Diagnosis and associated orders:     1. Knee strain, left, initial encounter  - Referral to Orthopedics    2. History of repair of ACL  - Referral to Orthopedics     Comments/MDM:     Reviewed results of x-ray with patient, which shows no acute osseous changes such as fracture or dislocation in the left knee.  Discussed that likely etiology of the  patient's symptoms is strain of the left knee with concern of ligamental tear.   Referral to orthopedics placed urgently for the patient.  Instructed patient to utilize Tylenol, Motrin, ice therapy, rest, elevation, and to minimize load/activity involving the knee until seen by specialist.  Offered crutches with the patient to offload the knee.  Patient denies at this time as she has crutches at home.  Instructed patient to utilize them to offload knee.  Patient agreeable to this plan of care.  All questions answered.  Return to clinic if symptoms persist or worsen.  Red flag symptoms warranting emergency medical services discussed, including but not limited to chest pain, SOB, wheezing, difficulty breathing or swallowing, sensation of throat closing or mass in the throat, severe intractable headache, changes to vision or hearing, palpitations or racing heart rate, abdominal pain, fever greater than 103F despite medication management, numbness and tingling, loss of sensation or motor control, deformity, severe swelling, severe pain, dizziness/lightheadedness/vertigo, or nausea/vomiting/diarrhea.           Differential diagnosis, natural history, supportive care, and indications for immediate follow-up discussed.    Advised the patient to follow-up with the primary care physician for recheck, reevaluation, and consideration of further management.    Instructed patient to seek emergency medical attention via calling EMS or going to the Emergency Room for red flag symptoms, including but not limited to: chest pain, palpitations, fever greater than 103F, shortness of breath, wheezing, new or worsened numbness/tingling, focal or unilateral weakness, and bloody vomit/stool.     Please note that this dictation was created using voice recognition software. I have made a reasonable attempt to correct obvious errors, but I expect that there are errors of grammar and possibly content that I did not discover before finalizing  the note.    This note was electronically signed by YESY Flower           [1]   Current Outpatient Medications:     amphetamine-dextroamphetamine (ADDERALL XR) 20 MG per XR capsule, , Disp: , Rfl:     buPROPion (WELLBUTRIN XL) 150 MG XL tablet, Take 1 Tablet by mouth every morning., Disp: 90 Tablet, Rfl: 0    cyclobenzaprine (FLEXERIL) 10 mg Tab, Take 1 Tablet by mouth 3 times a day as needed for Muscle Spasms or Moderate Pain., Disp: 30 Tablet, Rfl: 0    amphetamine-dextroamphetamine (ADDERALL) 20 MG Tab, , Disp: , Rfl:   [2] No Known Allergies  [3]   Past Surgical History:  Procedure Laterality Date    REPAIR, KNEE, ACL Left     back in HS

## 2025-07-11 PROBLEM — S83.249A MEDIAL MENISCUS TEAR: Status: ACTIVE | Noted: 2025-07-11

## 2025-07-28 ENCOUNTER — HOSPITAL ENCOUNTER (EMERGENCY)
Facility: MEDICAL CENTER | Age: 35
End: 2025-07-28
Attending: EMERGENCY MEDICINE
Payer: COMMERCIAL

## 2025-07-28 ENCOUNTER — PHARMACY VISIT (OUTPATIENT)
Dept: PHARMACY | Facility: MEDICAL CENTER | Age: 35
End: 2025-07-28
Payer: COMMERCIAL

## 2025-07-28 ENCOUNTER — OFFICE VISIT (OUTPATIENT)
Dept: URGENT CARE | Facility: PHYSICIAN GROUP | Age: 35
End: 2025-07-28
Payer: COMMERCIAL

## 2025-07-28 VITALS
HEART RATE: 95 BPM | BODY MASS INDEX: 25.73 KG/M2 | RESPIRATION RATE: 20 BRPM | WEIGHT: 190 LBS | HEIGHT: 72 IN | OXYGEN SATURATION: 100 % | DIASTOLIC BLOOD PRESSURE: 56 MMHG | SYSTOLIC BLOOD PRESSURE: 112 MMHG | TEMPERATURE: 96.6 F

## 2025-07-28 DIAGNOSIS — R10.9 RIGHT FLANK PAIN: Primary | ICD-10-CM

## 2025-07-28 DIAGNOSIS — N30.01 ACUTE CYSTITIS WITH HEMATURIA: ICD-10-CM

## 2025-07-28 LAB
APPEARANCE UR: NORMAL
BILIRUB UR STRIP-MCNC: NEGATIVE MG/DL
COLOR UR AUTO: NORMAL
GLUCOSE UR STRIP.AUTO-MCNC: NEGATIVE MG/DL
KETONES UR STRIP.AUTO-MCNC: NEGATIVE MG/DL
LEUKOCYTE ESTERASE UR QL STRIP.AUTO: NORMAL
NITRITE UR QL STRIP.AUTO: POSITIVE
PH UR STRIP.AUTO: 7 [PH] (ref 5–8)
POCT INT CON NEG: NEGATIVE
POCT INT CON POS: POSITIVE
POCT URINE PREGNANCY TEST: NEGATIVE
PROT UR QL STRIP: NORMAL MG/DL
RBC UR QL AUTO: NORMAL
SP GR UR STRIP.AUTO: 1.02
UROBILINOGEN UR STRIP-MCNC: NORMAL MG/DL

## 2025-07-28 PROCEDURE — RXMED WILLOW AMBULATORY MEDICATION CHARGE: Performed by: EMERGENCY MEDICINE

## 2025-07-28 RX ORDER — KETOROLAC TROMETHAMINE 15 MG/ML
15 INJECTION, SOLUTION INTRAMUSCULAR; INTRAVENOUS ONCE
Status: COMPLETED | OUTPATIENT
Start: 2025-07-28 | End: 2025-07-28

## 2025-07-28 RX ADMIN — KETOROLAC TROMETHAMINE 15 MG: 15 INJECTION, SOLUTION INTRAMUSCULAR; INTRAVENOUS at 12:25

## 2025-07-28 ASSESSMENT — PAIN DESCRIPTION - PAIN TYPE: TYPE: ACUTE PAIN

## 2025-07-28 ASSESSMENT — FIBROSIS 4 INDEX
FIB4 SCORE: 0.9
FIB4 SCORE: 0.9

## 2025-07-28 NOTE — ED NOTES
Pt given d/c instructions, f/u info and aware of RX x 1 for  with verbal understanding.  VSS at discharge.  PIV d/c'd with tip intact.  Pt dressed independently.  Pt ambulatory from the ED w/ steady gait.  All belongings in possession on discharge.  Pt escorted to the lobby by RN.

## 2025-07-28 NOTE — ED PROVIDER NOTES
"ER Provider Note    Scribed for Johny Porras M.d. by Jenise Mckeon. 7/28/2025  1:28 PM    Primary Care Provider: YESY Reyna    CHIEF COMPLAINT  Chief Complaint   Patient presents with    Painful Urination     Pt reports painful urination w/ cloudy urine and urinary frequency since yesterday.    Flank Pain     R sided onset this morning    Sent from Urgent Care     EXTERNAL RECORDS REVIEWED  Outpatient Notes The patient was seen at urgent care this morning for the same symptoms. She was given Toradol for the pain and was sent to the ED for further evaluation. Her pregnancy test performed there came back normal.    HPI/ROS  LIMITATION TO HISTORY   Select: : None    OUTSIDE HISTORIAN(S):  None    Isadora Emmanuel is a 35 y.o. female who presents to the ED via EMS from Urgent Care complaining of acute right side flank pain onset this morning ago. The patient was seen at urgent care where she received a Toradol shot, almost completely alleviating her pain. She was advised to present at ED today to further evaluate symptoms as \"they do not have the equipment\" to do so, according to the patient, who reports that she was told by urgent care that we use \"a different mechanism,\" to check urinalysis.  This may be referring to a dip versus microscopy.  Urgent care notes indicates that some concern was raised for the possibility of kidney stone, though it does not fit the patient's history or physical.  She notes having dysuria for the past 2 days. Denies a history of  kidney stones.  She has had some flank pain related to the dysuria.  No fevers, no vomiting.  Well-appearing, calm, cooperative, normal vital signs.    PAST MEDICAL HISTORY  No past medical history noted.    SURGICAL HISTORY  Past Surgical History[1]    FAMILY HISTORY  Family History   Problem Relation Age of Onset    Pancreatic Cancer Mother     Breast Cancer Maternal Grandmother        SOCIAL HISTORY   reports that " she has been smoking cigarettes. She has never used smokeless tobacco. She reports current alcohol use of about 3.6 oz of alcohol per week. She reports that she does not use drugs.    CURRENT MEDICATIONS  Discharge Medication List as of 7/28/2025  3:17 PM        CONTINUE these medications which have NOT CHANGED    Details   cyclobenzaprine (FLEXERIL) 10 mg Tab Take 1 Tablet by mouth 3 times a day as needed for Muscle Spasms or Moderate Pain., Disp-30 Tablet, R-0, Normal      amphetamine-dextroamphetamine (ADDERALL XR) 20 MG per XR capsule Historical Med      amphetamine-dextroamphetamine (ADDERALL) 20 MG Tab Historical Med      buPROPion (WELLBUTRIN XL) 150 MG XL tablet Take 1 Tablet by mouth every morning., Disp-90 Tablet, R-0, Normal             ALLERGIES  Patient has no known allergies.    PHYSICAL EXAM  VITAL SIGNS: /58   Pulse 80   Temp 36.9 °C (98.5 °F) (Temporal)   Resp 16   Ht 1.829 m (6')   Wt 86.2 kg (190 lb)   LMP 07/26/2025 (Exact Date)   SpO2 99%   BMI 25.77 kg/m²   Pulse ox interpretation: I interpret this pulse ox as normal.  Constitutional: Alert in no apparent distress.  HENT: No signs of trauma, Bilateral external ears normal, Nose normal.   Eyes: Conjunctiva normal, Non-icteric.   Neck: Normal range of motion, Supple, No stridor.   Lymphatic: No lymphadenopathy noted.   Cardiovascular: Regular rate and rhythm, no murmurs.   Thorax & Lungs: Normal breath sounds, No respiratory distress, No wheezing  Abdomen: Mild right lower quadrant abdominal tenderness. Bowel sounds normal, Soft, No masses, No pulsatile masses. No peritoneal signs.  Skin: Warm, Dry, No erythema, No rash.   Back: No midline bony tenderness. No CVA tenderness. No flank tenderness  Extremities: Intact distal pulses, No edema, No cyanosis.  Musculoskeletal: Good range of motion in all major joints. No or major deformities noted.   Neurologic: Alert , Normal motor function, Normal sensory function, No focal deficits  noted.   Psychiatric: Affect normal, Judgment normal, Mood normal.     DIAGNOSTIC STUDIES    Labs:   Labs Reviewed   URINALYSIS - Abnormal; Notable for the following components:       Result Value    Character Turbid (*)     Protein 300 (*)     Nitrite Positive (*)     Leukocyte Esterase Large (*)     Occult Blood Large (*)     All other components within normal limits   URINE MICROSCOPIC (W/UA) - Abnormal; Notable for the following components:    WBC >100 (*)     RBC 21-50 (*)     Bacteria Many (*)     All other components within normal limits       COURSE & MEDICAL DECISION MAKING     INITIAL ASSESSMENT, COURSE AND PLAN  Care Narrative:     1:28 PM - Patient presents to the ED with flank pain.  Patient evaluated at bedside and discussed plan of care, including ordering lab work to further evaluate. Ordered for UA and urine microscopic to evaluate her symptoms. Differential diagnoses include but not limited to: UTI vs kidney infection, and although have considered kidney or ureteral stone, it does not fit the patient's history or physical, including the pattern of her discomfort, with urinary tract infection considered much more likely. Pregnancy ruled out at urgent care. Kidney stone considered, but patient does not appear uncomfortable and she does not have CVA or flank tenderness at this time.     2:56 PM - I reevaluated the patient at bedside. I discussed the patient's diagnostic study results which show a kidney infection. I discussed plan for discharge with Bactrim and follow up as outlined below. I advised the patient to take the antibiotics as prescribed. The patient is stable for discharge at this time and will return for any new or worsening symptoms, including worsening instead of gradually improving, especially fevers or chills or flu like symptoms or uncontrolled vomiting. Patient verbalizes understanding and support with my plan for discharge. Patient will be treated with Ancef prior to discharge.        DISPOSITION AND DISCUSSIONS  I have discussed management of the patient with the following physicians and PEACE's:  None    Discussion of management with other [facility] or appropriate source(s): None     Escalation of care considered, and ultimately not performed: diagnostic imaging.  Considered, but kidney stone seems reasonably ruled out by history and physical, with a much more likely diagnosis of urinary tract infection with a component of pyelonephritis confirmed by lab testing.    Decision tools and prescription drugs considered including, but not limited to: Antibiotics Bactrim.    The patient will return for new or worsening symptoms and is stable at the time of discharge.      DISPOSITION:  Patient will be discharged home in stable condition.    FOLLOW UP:  [facility], Emergency Dept  [address] [facility] [address] [phone]    If symptoms worsen      OUTPATIENT MEDICATIONS:  Discharge Medication List as of 7/28/2025  3:17 PM        START taking these medications    Details   sulfamethoxazole-trimethoprim (BACTRIM DS) 800-160 MG tablet Take 1 Tablet by mouth 2 times a day for 7 days., Disp-14 Tablet, R-0, Normal               FINAL DIAGNOSIS  1. Pyelonephritis         Jenise CHAVARRIA (Scribe), am scribing for, and in the presence of, Johny Porras M.D..    Electronically signed by: Jenise Mckeon (Marylouibe), 7/28/2025    Johny CHAVARRIA M.D. personally performed the services described in this documentation, as scribed by Jenise Mckeon in my presence, and it is both accurate and complete.             [1]   Past Surgical History:  Procedure Laterality Date    REPAIR, KNEE, ACL Left     back in HS

## 2025-07-28 NOTE — PROGRESS NOTES
Subjective:   CHIEF COMPLAINT  Chief Complaint   Patient presents with    Back Pain     X this morning back pain on the right side.       [facility]  Isadora Emmanuel is a 35 y.o. female who presents with a chief severe right flank pain which started this morning.  Patient reports the few previous days she has been experiencing UTI type symptoms managing with OTCs.  And developed severe pain this morning.  Pain is constant and seem to be triggered with movement.  She has not tried any medications and no known alleviating factors.  Pain occasionally radiates anteriorly towards the groin.  Has noted suman hematuria however states she is currently on her menstrual cycle.  She feels nauseous but has not vomited.  Afebrile.  No history of abdominal surgeries.  No previous history of pyelonephritis or obstructive ureterolithiasis.    REVIEW OF SYSTEMS  General: no fever or chills  GI: Admits nausea without vomiting  See HPI for further details.    PAST MEDICAL HISTORY  Patient Active Problem List    Diagnosis Date Noted    Medial meniscus tear 07/11/2025    Chronic pain of right ankle 08/08/2024    Pain and swelling of toe of right foot 09/26/2022    History of COVID-19 06/06/2022    Tendinitis, de Quervain's 03/11/2022    History of repair of ACL 03/11/2022    History of abnormal cervical Pap smear 03/11/2022    ADHD 03/11/2022    Recurrent major depressive disorder, in partial remission (HCC) 03/11/2022    Vapes nicotine containing substance 03/11/2022       SURGICAL HISTORY   has a past surgical history that includes repair, knee, acl (Left).    ALLERGIES  Allergies[1]    CURRENT MEDICATIONS  Home Medications       Reviewed by Adriel Farley'inocente (Medical Assistant) on 07/28/25 at 1159  Med List Status: <None>     Medication Last Dose Status   amphetamine-dextroamphetamine (ADDERALL XR) 20 MG per XR capsule Taking Active   amphetamine-dextroamphetamine (ADDERALL) 20 MG Tab Not Taking Active   buPROPion  (WELLBUTRIN XL) 150 MG XL tablet Taking Active   cyclobenzaprine (FLEXERIL) 10 mg Tab  Active                    SOCIAL HISTORY  Social History     Tobacco Use    Smoking status: Some Days     Types: Cigarettes    Smokeless tobacco: Never   Vaping Use    Vaping status: Every Day    Substances: Nicotine, Flavoring    Devices: Refillable tank   Substance and Sexual Activity    Alcohol use: Yes     Alcohol/week: 3.6 oz     Types: 6 Standard drinks or equivalent per week    Drug use: Never    Sexual activity: Yes     Partners: Female       FAMILY HISTORY  Family History   Problem Relation Age of Onset    Pancreatic Cancer Mother     Breast Cancer Maternal Grandmother           Objective:   PHYSICAL EXAM  VITAL SIGNS: /56   Pulse 95   Temp 35.9 °C (96.6 °F) (Temporal)   Resp 20   Ht 1.829 m (6')   Wt 86.2 kg (190 lb)   SpO2 100%   BMI 25.77 kg/m²     Gen: Tearful, in distress in visible pain lying in left lateral recumbent  Skin: dry, intact, moist mucosal membranes  Eyes: No conjunctival injection bilaterally.  Neck: Normal range of motion. No meningeal signs.   Lungs: No increased work of breathing.  CTAB w/ symmetric expansion  CV: RRR w/o murmurs or clicks  Abdomen: Bowel sounds normal, soft, no distention.  TTP along the right flank, and to a lesser extent on the right upper/lower quadrants.  No rigidity or involuntary guarding.    Psych: normal affect, normal judgement, alert, awake    Assessment/Plan:     1. Right flank pain  POCT Urinalysis    POCT PREGNANCY    ketorolac (Toradol) 15 MG/ML injection 15 mg      2. Acute cystitis with hematuria        Suspect obstructive ureterolithiasis versus pyelonephritis.  UA is consistent with an acute cystitis.  hCG was negative.  She was in visible distress and needed a higher level of care and evaluation [facility] recently by an urgent care setting therefore [facility] was contacted to transport to the hospital.  Toradol 15 mg IM x 1 was given in clinic prior to  departure.          Please note that this dictation was created using voice recognition software. I have made a reasonable attempt to correct obvious errors, but I expect that there are errors of grammar and possibly content that I did not discover before finalizing the note.              [1] No Known Allergies

## 2025-07-28 NOTE — ED TRIAGE NOTES
Isadora Emmanuel  35 y.o.  female  [facility] from [facility] for     Chief Complaint   Patient presents with    Painful Urination     Pt reports painful urination w/ cloudy urine and urinary frequency since yesterday.    Flank Pain     R sided onset this morning    Sent from Urgent Care     Per report, pt went to UC today and had positive UTI.  Pt was in significant amount of pain so pt was given 15mg toradol pta.  EMS reports pt had one episode of hypotension (99/66)  so PIV placed and pt was given 250cc NS pta.  Pt arrives reporting pain much relieved at 3/10.    /58   Pulse 80   Temp 36.9 °C (98.5 °F) (Temporal)   Resp 16   Ht 1.829 m (6')   Wt 86.2 kg (190 lb)   LMP 07/26/2025 (Exact Date)   SpO2 99%   BMI 25.77 kg/m²

## 2025-07-28 NOTE — DISCHARGE INSTRUCTIONS
Your exam and labs suggest a kidney infection rather than a kidney stone.  Please complete the prescribed antibiotics, return if you are doing worse instead of gradually better, especially fevers or chills or flulike symptoms or uncontrolled vomiting.